# Patient Record
Sex: FEMALE | Race: WHITE | NOT HISPANIC OR LATINO | Employment: OTHER | URBAN - METROPOLITAN AREA
[De-identification: names, ages, dates, MRNs, and addresses within clinical notes are randomized per-mention and may not be internally consistent; named-entity substitution may affect disease eponyms.]

---

## 2023-03-23 ENCOUNTER — OFFICE VISIT (OUTPATIENT)
Dept: GASTROENTEROLOGY | Facility: CLINIC | Age: 83
End: 2023-03-23

## 2023-03-23 ENCOUNTER — TELEPHONE (OUTPATIENT)
Dept: GASTROENTEROLOGY | Facility: CLINIC | Age: 83
End: 2023-03-23

## 2023-03-23 VITALS
SYSTOLIC BLOOD PRESSURE: 110 MMHG | WEIGHT: 133 LBS | BODY MASS INDEX: 24.48 KG/M2 | HEIGHT: 62 IN | DIASTOLIC BLOOD PRESSURE: 60 MMHG

## 2023-03-23 DIAGNOSIS — Z79.02 LONG TERM CURRENT USE OF ANTITHROMBOTICS/ANTIPLATELETS: ICD-10-CM

## 2023-03-23 DIAGNOSIS — R14.2 BELCHING: ICD-10-CM

## 2023-03-23 DIAGNOSIS — Z86.010 HISTORY OF COLON POLYPS: ICD-10-CM

## 2023-03-23 DIAGNOSIS — K58.2 IRRITABLE BOWEL SYNDROME WITH BOTH CONSTIPATION AND DIARRHEA: ICD-10-CM

## 2023-03-23 DIAGNOSIS — G45.9 TIA (TRANSIENT ISCHEMIC ATTACK): ICD-10-CM

## 2023-03-23 DIAGNOSIS — R13.19 ESOPHAGEAL DYSPHAGIA: Primary | ICD-10-CM

## 2023-03-23 PROBLEM — Z86.0100 HISTORY OF COLON POLYPS: Status: ACTIVE | Noted: 2023-03-23

## 2023-03-23 RX ORDER — NIACIN 500 MG
500 TABLET ORAL
COMMUNITY

## 2023-03-23 RX ORDER — LORAZEPAM 0.5 MG/1
0.5 TABLET ORAL AS NEEDED
COMMUNITY

## 2023-03-23 RX ORDER — VITAMIN B COMPLEX
TABLET ORAL DAILY
COMMUNITY

## 2023-03-23 RX ORDER — ACETAMINOPHEN 500 MG
500 TABLET ORAL 3 TIMES DAILY PRN
COMMUNITY

## 2023-03-23 RX ORDER — EZETIMIBE 10 MG/1
10 TABLET ORAL DAILY
COMMUNITY

## 2023-03-23 RX ORDER — CLOPIDOGREL BISULFATE 75 MG/1
75 TABLET ORAL DAILY
COMMUNITY

## 2023-03-23 RX ORDER — DIPHENOXYLATE HYDROCHLORIDE AND ATROPINE SULFATE 2.5; .025 MG/1; MG/1
1 TABLET ORAL 4 TIMES DAILY PRN
COMMUNITY

## 2023-03-23 RX ORDER — SIMVASTATIN 40 MG
40 TABLET ORAL
COMMUNITY

## 2023-03-23 RX ORDER — LOSARTAN POTASSIUM AND HYDROCHLOROTHIAZIDE 12.5; 5 MG/1; MG/1
1 TABLET ORAL DAILY
COMMUNITY

## 2023-03-23 RX ORDER — IBANDRONATE SODIUM 150 MG/1
150 TABLET, FILM COATED ORAL
COMMUNITY

## 2023-03-23 RX ORDER — MULTIVIT-MIN/IRON/FOLIC ACID/K 18-600-40
CAPSULE ORAL DAILY
COMMUNITY

## 2023-03-23 RX ORDER — ALBUTEROL SULFATE 90 UG/1
2 POWDER, METERED RESPIRATORY (INHALATION) EVERY 4 HOURS PRN
COMMUNITY

## 2023-03-23 NOTE — TELEPHONE ENCOUNTER
Scheduled date of EGD(as of today):4/20/23  Physician performing EGD:NBA  Location of EGD:[facility]  Instructions reviewed with patient by: LOI  Clearances:  Wilfrid Mcconnell

## 2023-03-23 NOTE — PROGRESS NOTES
[address] [facility] - Outpatient Consultation  Bret Chaudhry [de-identified] y o  female MRN: [id number]  Encounter: [id number]    ASSESSMENT AND PLAN:      1  Esophageal dysphagia  82F here today as a new pt at the request of Dr Vanda Primrose, MD for dysphagia and belching/regurgitation  Ddx: gerd w stricture vs presbyesophagus/esoph spasms? - Schedule EGD @ [address] [facility] for EOE and dilate  - Pending EGD, consider PPI therapy    2  Belching/Regurgitation  Sounds like reflux - pt w hx of similar symptoms needing PPI in past     - GERD lifestyle modifications  - Stay upright for 2-3 hours post eating    3  Irritable bowel syndrome with both constipation and diarrhea  Well controlled as long as she takes citracel  Neg colonoscopy in the past     4  History of colon polyps  Will obtain reports from [facility]    5  TIA (transient ischemic attack)  Pt states doctors are split about her 20 min hand numbness spells  Some think its a TIA and started her on Plavix  Others feel it's more neurologic radiculopathy/numbness  6  Long term current use of antithrombotics/antiplatelets  Regardless on Plavix  Will need OK from cardiology to hold  Patient on chronic Plavix  Risks and benefits of holding the Plavix prior to the procedure discussed with the patient  Patient understands the risks of embolic events while holding the Plavix  Patient understands the risks of bleeding if continuation of Plavix is chosen  Followup Appointment: 3 months  ______________________________________________________________________    Chief Complaint   Patient presents with   • Dysphagia   • belching       HPI:   Bret Chaudhry is a [de-identified]y o  year old female who presents today at the request of her PCP for dysphagia  Patient states that she had similar symptoms long time ago  Had an endoscopy with dilation in 2016 at [address] [facility] [address] for GI health    During that time she did take omeprazole for a few months  Generally does well and has not had any issues until the past 3 months when she has had progressive dysphagia for solids and liquids  Does not smoke or drink  She feels the food or liquid bolus gets hung up in the middle of her esophagus  Some discomfort with this  Very slow moving  No coughing  A lot of regurgitation and belching  Sometimes she can taste the meal in her mouth  Denies heartburn  No abdominal pains or nausea  Has baseline irritable bowel with diarrhea alternating with constipation  Well-controlled as long as she takes her Citrucel and drinks her water      Historical Information   Past Medical History:   Diagnosis Date   • Colon polyp    • Hyperlipidemia    • Irritable bowel syndrome    • S/P dilatation of esophageal stricture    • TIA (transient ischemic attack)      Past Surgical History:   Procedure Laterality Date   • ANKLE FRACTURE SURGERY     • APPENDECTOMY     • CHOLECYSTECTOMY     • COLONOSCOPY     • ELBOW FRACTURE SURGERY     • TONSILLECTOMY     • UPPER GASTROINTESTINAL ENDOSCOPY     • WRIST FRACTURE SURGERY       Social History     Substance and Sexual Activity   Alcohol Use Not Currently     Social History     Substance and Sexual Activity   Drug Use Not on file     Social History     Tobacco Use   Smoking Status Never   Smokeless Tobacco Never     Family History   Adopted: Yes   Problem Relation Age of Onset   • Colon cancer Neg Hx    • Colon polyps Neg Hx        Meds/Allergies     Current Outpatient Medications:   •  acetaminophen (TYLENOL) 500 mg tablet  •  Albuterol Sulfate (ProAir RespiClick) 242 (90 Base) MCG/ACT AEPB  •  Cholecalciferol (Vitamin D) 50 MCG (2000 UT) CAPS  •  clopidogrel (PLAVIX) 75 mg tablet  •  Coenzyme Q10 (CoQ10) 100 MG CAPS  •  conjugated estrogens (PREMARIN) 0 625 mg tablet  •  diphenoxylate-atropine (LOMOTIL) 2 5-0 025 mg per tablet  •  ezetimibe (ZETIA) 10 mg tablet  •  ibandronate (BONIVA) 150 MG tablet  •  LORazepam (ATIVAN) 0 5 mg tablet  •  losartan-hydrochlorothiazide (HYZAAR) 50-12 5 mg per tablet  •  niacin 500 mg tablet  •  sertraline (ZOLOFT) 50 mg tablet  •  simvastatin (ZOCOR) 40 mg tablet    Allergies   Allergen Reactions   • Aspirin Other (See Comments)     Doesn't set well in stomach   • Ibuprofen Swelling     Hands and feet       PHYSICAL EXAM:    Blood pressure 110/60, height 5' 2" (1 575 m), weight 60 3 kg (133 lb)  Body mass index is 24 33 kg/m²  General Appearance: NAD, cooperative, alert  Eyes: Anicteric, PERRLA, EOMI  ENT:  Normocephalic, atraumatic, normal mucosa  Neck:  Supple, symmetrical, trachea midline,   Resp:  Clear to auscultation bilaterally; no rales, rhonchi or wheezing; respirations unlabored   CV:  S1 S2, Regular rate and rhythm; no murmur, rub, or gallop  GI:  Soft, non-tender, non-distended; normal bowel sounds; no masses, no organomegaly   Rectal: Deferred  Musculoskeletal: No cyanosis, clubbing or edema  Normal ROM  Skin:  No jaundice, rashes, or lesions   Heme/Lymph: No palpable cervical lymphadenopathy  Psych: Normal affect, good eye contact  Neuro: No gross deficits, AAOx3    Lab Results:   No results found for: WBC, HGB, HCT, MCV, PLT  No results found for: NA, K, CL, CO2, ANIONGAP, BUN, CREATININE, GLUCOSE, GLUF, CALCIUM, CORRECTEDCA, AST, ALT, ALKPHOS, PROT, BILITOT, EGFR  No results found for: IRON, TIBC, FERRITIN  No results found for: LIPASE    Radiology Results:   No results found  REVIEW OF SYSTEMS:    CONSTITUTIONAL: Denies any fever, chills, rigors, and weight loss  HEENT: No earache or tinnitus  Denies hearing loss or visual disturbances  CARDIOVASCULAR: No chest pain or palpitations  RESPIRATORY: Denies any cough, hemoptysis, shortness of breath or dyspnea on exertion  GASTROINTESTINAL: As noted in the History of Present Illness  GENITOURINARY: No problems with urination  Denies any hematuria or dysuria  NEUROLOGIC: No dizziness or vertigo, denies headaches  MUSCULOSKELETAL: Denies any muscle or joint pain  SKIN: Denies skin rashes or itching  ENDOCRINE: Denies excessive thirst  Denies intolerance to heat or cold  PSYCHOSOCIAL: Denies depression or anxiety  Denies any recent memory loss

## 2023-03-23 NOTE — LETTER
March 23, 2023     [address]  [facility] [address] [address]    Patient: Cassie Ross   YOB: 1940   Date of Visit: 3/23/2023       Dear Dr Tonio Henry: Thank you for referring Cassie Ross to me for evaluation  Below are my notes for this consultation  If you have questions, please do not hesitate to call me  I look forward to following your patient along with you  Sincerely,        Flori Mark MD        CC: Leander Sierra MD  3/23/2023  5:13 PM  Sign when Signing Visit    [facility] - Outpatient Consultation  Cassie Ross [de-identified] y o  female MRN: [id number]  Encounter: [id number]    ASSESSMENT AND PLAN:      1  Esophageal dysphagia  82F here today as a new pt at the request of Dr Margy Barrett MD for dysphagia and belching/regurgitation  Ddx: gerd w stricture vs presbyesophagus/esoph spasms? - Schedule EGD @ [address] [place] [address] for EOE and dilate  - Pending EGD, consider PPI therapy    2  Belching/Regurgitation  Sounds like reflux - pt w hx of similar symptoms needing PPI in past     - GERD lifestyle modifications  - Stay upright for 2-3 hours post eating    3  Irritable bowel syndrome with both constipation and diarrhea  Well controlled as long as she takes citracel  Neg colonoscopy in the past     4  History of colon polyps  Will obtain reports from [facility]    5  TIA (transient ischemic attack)  Pt states doctors are split about her 20 min hand numbness spells  Some think its a TIA and started her on Plavix  Others feel it's more neurologic radiculopathy/numbness  6  Long term current use of antithrombotics/antiplatelets  Regardless on Plavix  Will need OK from cardiology to hold  Patient on chronic Plavix  Risks and benefits of holding the Plavix prior to the procedure discussed with the patient  Patient understands the risks of embolic events while holding the Plavix  Patient understands the risks of bleeding if continuation of Plavix is chosen  Followup Appointment: 3 months  ______________________________________________________________________    Chief Complaint   Patient presents with   • Dysphagia   • belching       HPI:   Jaswant Canales is a [de-identified]y o  year old female who presents today at the request of her PCP for dysphagia  Patient states that she had similar symptoms long time ago  Had an endoscopy with dilation in 2016 at [address] [facility], [address] for GI health  During that time she did take omeprazole for a few months  Generally does well and has not had any issues until the past 3 months when she has had progressive dysphagia for solids and liquids  Does not smoke or drink  She feels the food or liquid bolus gets hung up in the middle of her esophagus  Some discomfort with this  Very slow moving  No coughing  A lot of regurgitation and belching  Sometimes she can taste the meal in her mouth  Denies heartburn  No abdominal pains or nausea  Has baseline irritable bowel with diarrhea alternating with constipation  Well-controlled as long as she takes her Citrucel and drinks her water      Historical Information   Past Medical History:   Diagnosis Date   • Colon polyp    • Hyperlipidemia    • Irritable bowel syndrome    • S/P dilatation of esophageal stricture    • TIA (transient ischemic attack)      Past Surgical History:   Procedure Laterality Date   • ANKLE FRACTURE SURGERY     • APPENDECTOMY     • CHOLECYSTECTOMY     • COLONOSCOPY     • ELBOW FRACTURE SURGERY     • TONSILLECTOMY     • UPPER GASTROINTESTINAL ENDOSCOPY     • WRIST FRACTURE SURGERY       Social History     Substance and Sexual Activity   Alcohol Use Not Currently     Social History     Substance and Sexual Activity   Drug Use Not on file     Social History     Tobacco Use   Smoking Status Never   Smokeless Tobacco Never     Family History   Adopted: Yes   Problem Relation Age of Onset   • Colon cancer Neg Hx    • Colon polyps Neg Hx        Meds/Allergies      Current Outpatient Medications:   •  acetaminophen (TYLENOL) 500 mg tablet  •  Albuterol Sulfate (ProAir RespiClick) 378 (90 Base) MCG/ACT AEPB  •  Cholecalciferol (Vitamin D) 50 MCG (2000 UT) CAPS  •  clopidogrel (PLAVIX) 75 mg tablet  •  Coenzyme Q10 (CoQ10) 100 MG CAPS  •  conjugated estrogens (PREMARIN) 0 625 mg tablet  •  diphenoxylate-atropine (LOMOTIL) 2 5-0 025 mg per tablet  •  ezetimibe (ZETIA) 10 mg tablet  •  ibandronate (BONIVA) 150 MG tablet  •  LORazepam (ATIVAN) 0 5 mg tablet  •  losartan-hydrochlorothiazide (HYZAAR) 50-12 5 mg per tablet  •  niacin 500 mg tablet  •  sertraline (ZOLOFT) 50 mg tablet  •  simvastatin (ZOCOR) 40 mg tablet    Allergies   Allergen Reactions   • Aspirin Other (See Comments)     Doesn't set well in stomach   • Ibuprofen Swelling     Hands and feet       PHYSICAL EXAM:    Blood pressure 110/60, height 5' 2" (1 575 m), weight 60 3 kg (133 lb)  Body mass index is 24 33 kg/m²  General Appearance: NAD, cooperative, alert  Eyes: Anicteric, PERRLA, EOMI  ENT:  Normocephalic, atraumatic, normal mucosa  Neck:  Supple, symmetrical, trachea midline,   Resp:  Clear to auscultation bilaterally; no rales, rhonchi or wheezing; respirations unlabored   CV:  S1 S2, Regular rate and rhythm; no murmur, rub, or gallop  GI:  Soft, non-tender, non-distended; normal bowel sounds; no masses, no organomegaly   Rectal: Deferred  Musculoskeletal: No cyanosis, clubbing or edema  Normal ROM    Skin:  No jaundice, rashes, or lesions   Heme/Lymph: No palpable cervical lymphadenopathy  Psych: Normal affect, good eye contact  Neuro: No gross deficits, AAOx3    Lab Results:   No results found for: WBC, HGB, HCT, MCV, PLT  No results found for: NA, K, CL, CO2, ANIONGAP, BUN, CREATININE, GLUCOSE, GLUF, CALCIUM, CORRECTEDCA, AST, ALT, ALKPHOS, PROT, BILITOT, EGFR  No results found for: IRON, TIBC, FERRITIN  No results found for: LIPASE    Radiology Results:   No results found  REVIEW OF SYSTEMS:    CONSTITUTIONAL: Denies any fever, chills, rigors, and weight loss  HEENT: No earache or tinnitus  Denies hearing loss or visual disturbances  CARDIOVASCULAR: No chest pain or palpitations  RESPIRATORY: Denies any cough, hemoptysis, shortness of breath or dyspnea on exertion  GASTROINTESTINAL: As noted in the History of Present Illness  GENITOURINARY: No problems with urination  Denies any hematuria or dysuria  NEUROLOGIC: No dizziness or vertigo, denies headaches  MUSCULOSKELETAL: Denies any muscle or joint pain  SKIN: Denies skin rashes or itching  ENDOCRINE: Denies excessive thirst  Denies intolerance to heat or cold  PSYCHOSOCIAL: Denies depression or anxiety  Denies any recent memory loss

## 2023-03-29 ENCOUNTER — TELEPHONE (OUTPATIENT)
Dept: GASTROENTEROLOGY | Facility: CLINIC | Age: 83
End: 2023-03-29

## 2023-03-29 NOTE — TELEPHONE ENCOUNTER
Spoke to patient and confirmed with her that her last dose of plavix would be 4/14/2023 prior to EGD

## 2023-04-06 ENCOUNTER — TELEPHONE (OUTPATIENT)
Dept: GASTROENTEROLOGY | Facility: CLINIC | Age: 83
End: 2023-04-06

## 2023-04-06 NOTE — TELEPHONE ENCOUNTER
Procedure confirmed  Endoscopy     Via: Voice mail    Instructions given: Given to Patient at Visit     Prep Given: N/A    Call the office if there are any questions  Voicemail included reminder last dose Plavix is 4/14/2023

## 2023-04-20 ENCOUNTER — ANESTHESIA (OUTPATIENT)
Dept: ANESTHESIOLOGY | Facility: AMBULATORY SURGERY CENTER | Age: 83
End: 2023-04-20

## 2023-04-20 ENCOUNTER — HOSPITAL ENCOUNTER (OUTPATIENT)
Dept: GASTROENTEROLOGY | Facility: AMBULATORY SURGERY CENTER | Age: 83
Discharge: HOME | End: 2023-04-20
Attending: INTERNAL MEDICINE

## 2023-04-20 ENCOUNTER — ANESTHESIA EVENT (OUTPATIENT)
Dept: ANESTHESIOLOGY | Facility: AMBULATORY SURGERY CENTER | Age: 83
End: 2023-04-20

## 2023-04-20 VITALS
WEIGHT: 126 LBS | TEMPERATURE: 97.9 F | DIASTOLIC BLOOD PRESSURE: 64 MMHG | SYSTOLIC BLOOD PRESSURE: 134 MMHG | HEART RATE: 67 BPM | RESPIRATION RATE: 13 BRPM | HEIGHT: 62 IN | BODY MASS INDEX: 23.19 KG/M2 | OXYGEN SATURATION: 98 %

## 2023-04-20 DIAGNOSIS — R13.19 ESOPHAGEAL DYSPHAGIA: ICD-10-CM

## 2023-04-20 RX ORDER — SODIUM CHLORIDE, SODIUM LACTATE, POTASSIUM CHLORIDE, CALCIUM CHLORIDE 600; 310; 30; 20 MG/100ML; MG/100ML; MG/100ML; MG/100ML
50 INJECTION, SOLUTION INTRAVENOUS CONTINUOUS
Status: DISCONTINUED | OUTPATIENT
Start: 2023-04-20 | End: 2023-04-24 | Stop reason: HOSPADM

## 2023-04-20 RX ADMIN — SODIUM CHLORIDE, SODIUM LACTATE, POTASSIUM CHLORIDE, CALCIUM CHLORIDE 50 ML/HR: 600; 310; 30; 20 INJECTION, SOLUTION INTRAVENOUS at 13:35

## 2023-04-20 NOTE — H&P
"History and Physical - [facility]    Pratik President [de-identified] y o  female MRN: [id number]      HPI: Pratik President is a [de-identified]y o  year old female who presents for dysphagia  Plavix held  Allergies   Allergen Reactions   • Amoxicillin-Pot Clavulanate GI Intolerance   • Aspirin Other (See Comments)     Doesn't set well in stomach   • Ibuprofen Swelling     Hands and feet   • Nexium [Esomeprazole] Myalgia   • Nortriptyline GI Intolerance     \"heartburn\"   • Prednisone Other (See Comments)     Hyper/insomnia/flushed         REVIEW OF SYSTEMS: Per the HPI, and otherwise unremarkable      Historical Information     Past Medical History:   Diagnosis Date   • Colon polyp    • Glaucoma    • Hyperlipidemia    • Irritable bowel syndrome    • S/P dilatation of esophageal stricture    • TIA (transient ischemic attack)      Past Surgical History:   Procedure Laterality Date   • ANKLE FRACTURE SURGERY Right    • APPENDECTOMY     • CATARACT EXTRACTION Left    • CHOLECYSTECTOMY     • COLONOSCOPY     • ELBOW FRACTURE SURGERY Left    • TONSILLECTOMY     • UPPER GASTROINTESTINAL ENDOSCOPY     • WRIST FRACTURE SURGERY Left      Social History   Social History     Substance and Sexual Activity   Alcohol Use Not Currently     Social History     Substance and Sexual Activity   Drug Use Never     Social History     Tobacco Use   Smoking Status Never   Smokeless Tobacco Never     Family History   Adopted: Yes   Problem Relation Age of Onset   • Colon cancer Neg Hx    • Colon polyps Neg Hx        Meds/Allergies       Current Outpatient Medications:   •  acetaminophen (TYLENOL) 500 mg tablet  •  Cholecalciferol (Vitamin D) 50 MCG (2000 UT) CAPS  •  Coenzyme Q10 (CoQ10) 100 MG CAPS  •  diphenoxylate-atropine (LOMOTIL) 2 5-0 025 mg per tablet  •  ezetimibe (ZETIA) 10 mg tablet  •  ibandronate (BONIVA) 150 MG tablet  •  losartan-hydrochlorothiazide (HYZAAR) 50-12 5 mg per tablet  •  niacin 500 mg tablet  •  sertraline " "(ZOLOFT) 50 mg tablet  •  simvastatin (ZOCOR) 40 mg tablet  •  Albuterol Sulfate (ProAir RespiClick) 672 (90 Base) MCG/ACT AEPB  •  clopidogrel (PLAVIX) 75 mg tablet  •  conjugated estrogens (PREMARIN) 0 625 mg tablet  •  LORazepam (ATIVAN) 0 5 mg tablet    Current Facility-Administered Medications:   •  lactated ringers infusion, 50 mL/hr, Intravenous, Continuous, Continue from Pre-op at 04/20/23 1344        Objective     /62   Pulse 68   Temp 97 9 °F (36 6 °C) (Temporal)   Resp 15   Ht 5' 2\" (1 575 m)   Wt 57 2 kg (126 lb)   SpO2 93%   BMI 23 05 kg/m²       PHYSICAL EXAM    Gen: NAD AAOx3  Head: Normocephalic, Atraumatic  CV: S1S2 RRR no m/r/g  CHEST: Clear b/l no c/r/w  ABD: soft, +BS NT/ND no masses  EXT: no edema      ASSESSMENT/PLAN:  This is a [de-identified]y o  year old female here for EGD/DILATION, and she is stable and optimized for her procedure        "

## 2023-04-20 NOTE — ANESTHESIA PREPROCEDURE EVALUATION
Procedure:  PRE-OP ONLY    Relevant Problems   GI/HEPATIC   (+) Esophageal dysphagia      NEURO/PSYCH   (+) History of colon polyps   (+) TIA (transient ischemic attack)             Anesthesia Plan  ASA Score- 3     Anesthesia Type- IV sedation with anesthesia with ASA Monitors  Additional Monitors:   Airway Plan:           Plan Factors-    Chart reviewed  Patient is not a current smoker  Induction- intravenous  Postoperative Plan-     Informed Consent- Anesthetic plan and risks discussed with patient  I personally reviewed this patient with the CRNA  Discussed and agreed on the Anesthesia Plan with the CRNA  Padilla Boyer

## 2023-06-05 ENCOUNTER — TELEPHONE (OUTPATIENT)
Dept: GASTROENTEROLOGY | Facility: CLINIC | Age: 83
End: 2023-06-05

## 2023-06-06 ENCOUNTER — OFFICE VISIT (OUTPATIENT)
Dept: GASTROENTEROLOGY | Facility: CLINIC | Age: 83
End: 2023-06-06
Payer: COMMERCIAL

## 2023-06-06 VITALS
HEIGHT: 62 IN | SYSTOLIC BLOOD PRESSURE: 90 MMHG | WEIGHT: 132 LBS | DIASTOLIC BLOOD PRESSURE: 50 MMHG | BODY MASS INDEX: 24.29 KG/M2

## 2023-06-06 DIAGNOSIS — R13.19 ESOPHAGEAL DYSPHAGIA: ICD-10-CM

## 2023-06-06 DIAGNOSIS — R15.9 FULL INCONTINENCE OF FECES: ICD-10-CM

## 2023-06-06 DIAGNOSIS — Z86.010 HISTORY OF COLON POLYPS: ICD-10-CM

## 2023-06-06 DIAGNOSIS — K58.2 IRRITABLE BOWEL SYNDROME WITH BOTH CONSTIPATION AND DIARRHEA: Primary | ICD-10-CM

## 2023-06-06 DIAGNOSIS — G45.9 TIA (TRANSIENT ISCHEMIC ATTACK): ICD-10-CM

## 2023-06-06 DIAGNOSIS — Z79.02 LONG TERM CURRENT USE OF ANTITHROMBOTICS/ANTIPLATELETS: ICD-10-CM

## 2023-06-06 PROCEDURE — 99214 OFFICE O/P EST MOD 30 MIN: CPT | Performed by: INTERNAL MEDICINE

## 2023-06-06 RX ORDER — LOPERAMIDE HYDROCHLORIDE 2 MG/1
2 CAPSULE ORAL EVERY MORNING
Qty: 30 CAPSULE | Refills: 1 | Status: SHIPPED | OUTPATIENT
Start: 2023-06-06

## 2023-06-06 RX ORDER — CLOPIDOGREL BISULFATE 75 MG/1
TABLET ORAL
COMMUNITY

## 2023-06-06 RX ORDER — METHYLCELLULOSE 2 G/19G
POWDER, FOR SOLUTION ORAL
COMMUNITY
End: 2023-06-06

## 2023-06-06 NOTE — PROGRESS NOTES
[facility] - Outpatient Follow-up Note  Kallie Cui [de-identified] y o  female MRN: [id number]  Encounter: [id number]    ASSESSMENT AND PLAN:      1  Irritable bowel syndrome with both constipation and diarrhea  82F w long hx of IBS, aggravated by stress and eating  Sometimes constipated but main issue now seems more of incontinence w loose stools  - Will stop Citracel for now  - Increase water intake  - Start imodium 1 tab qam and will titrate to comfort so she can go out without accidents  - pelvic floor PT  - dairy free diet trial    - loperamide (IMODIUM) 2 mg capsule; Take 1 capsule (2 mg total) by mouth every morning  Dispense: 30 capsule; Refill: 1    2  Full incontinence of feces    - Ambulatory Referral to Physical Therapy; Future    3  Esophageal dysphagia  Improved after EGD/dilation in 4/2023    4  History of colon polyps  No further recall due to age  5  TIA (transient ischemic attack)  On Plavix    6  Long term current use of antithrombotics/antiplatelets        Followup Appointment: 3 months  ______________________________________________________________________    Chief Complaint   Patient presents with   • Diarrhea   • Inflammatory Bowel Disease     worsening     HPI: 82-year-old female with long history of IBS here today for for follow-up  Did have her EGD with dilation for dysphagia 2 months ago with resolution of her symptoms  Otherwise doing well from an upper GI perspective  Today, she wants to discuss her IBS  In the past, she used to be constipated but she says with age, she has grown more towards the diarrhea  She normally has 1 soft formed bowel movement in the morning followed by couple episodes of explosive loose bowels  Sometimes she has little stool balls which she attributes to her diverticular pockets  She has been on Citrucel  She is scared to eat when she is out    She has had episodes of full fecal incontinence where she found stool smeared in her underpants  No bleeding  No weight loss  She takes Lomotil as needed about couple times a week but nothing on a regular basis      Historical Information   Past Medical History:   Diagnosis Date   • Colon polyp    • Glaucoma    • Hyperlipidemia    • Irritable bowel syndrome    • S/P dilatation of esophageal stricture    • TIA (transient ischemic attack)      Past Surgical History:   Procedure Laterality Date   • ANKLE FRACTURE SURGERY Right    • APPENDECTOMY     • CATARACT EXTRACTION Left    • CHOLECYSTECTOMY     • COLONOSCOPY     • ELBOW FRACTURE SURGERY Left    • TONSILLECTOMY     • UPPER GASTROINTESTINAL ENDOSCOPY     • WRIST FRACTURE SURGERY Left      Social History     Substance and Sexual Activity   Alcohol Use Not Currently     Social History     Substance and Sexual Activity   Drug Use Never     Social History     Tobacco Use   Smoking Status Never   Smokeless Tobacco Never     Family History   Adopted: Yes   Problem Relation Age of Onset   • Colon cancer Neg Hx    • Colon polyps Neg Hx          Current Outpatient Medications:   •  acetaminophen (TYLENOL) 500 mg tablet  •  Albuterol Sulfate (ProAir RespiClick) 338 (90 Base) MCG/ACT AEPB  •  Cholecalciferol (Vitamin D) 50 MCG (2000 UT) CAPS  •  clopidogrel (PLAVIX) 75 mg tablet  •  Coenzyme Q10 (CoQ10) 100 MG CAPS  •  conjugated estrogens (PREMARIN) 0 625 mg tablet  •  diphenoxylate-atropine (LOMOTIL) 2 5-0 025 mg per tablet  •  ezetimibe (ZETIA) 10 mg tablet  •  ibandronate (BONIVA) 150 MG tablet  •  loperamide (IMODIUM) 2 mg capsule  •  LORazepam (ATIVAN) 0 5 mg tablet  •  losartan-hydrochlorothiazide (HYZAAR) 50-12 5 mg per tablet  •  niacin 500 mg tablet  •  sertraline (ZOLOFT) 50 mg tablet  •  simvastatin (ZOCOR) 40 mg tablet  Allergies   Allergen Reactions   • Amoxicillin-Pot Clavulanate GI Intolerance   • Aspirin Other (See Comments)     Doesn't set well in stomach   • Ibuprofen Swelling     Hands and feet   • Nexium [Esomeprazole] "Myalgia   • Nortriptyline GI Intolerance     \"heartburn\"   • Prednisone Other (See Comments)     Hyper/insomnia/flushed     Reviewed medications and allergies and updated as indicated    PHYSICAL EXAM:    Blood pressure 90/50, height 5' 2\" (1 575 m), weight 59 9 kg (132 lb)  Body mass index is 24 14 kg/m²  General Appearance: NAD, cooperative, alert  Eyes: Anicteric, PERRLA, EOMI  ENT:  Normocephalic, atraumatic, normal mucosa  Neck:  Supple, symmetrical, trachea midline  Resp:  Clear to auscultation bilaterally; no rales, rhonchi or wheezing; respirations unlabored   CV:  S1 S2, Regular rate and rhythm; no murmur, rub, or gallop  GI:  Soft, non-tender, non-distended; normal bowel sounds; no masses, no organomegaly   Rectal: Deferred  Musculoskeletal: No cyanosis, clubbing or edema  Normal ROM  Skin:  No jaundice, rashes, or lesions   Heme/Lymph: No palpable cervical lymphadenopathy  Psych: Normal affect, good eye contact  Neuro: No gross deficits, AAOx3    Lab Results:   No results found for: \"HCT\", \"HGB\", \"MCV\", \"PLT\", \"WBC\"  No results found for: \"ALKPHOS\", \"ALT\", \"ANIONGAP\", \"AST\", \"BILITOT\", \"BUN\", \"CALCIUM\", \"CL\", \"CO2\", \"CORRECTEDCA\", \"CREATININE\", \"EGFR\", \"GLUCOSE\", \"GLUF\", \"K\", \"NA\", \"PROT\"  No results found for: \"FERRITIN\", \"IRON\", \"TIBC\"  No results found for: \"LIPASE\"    Radiology Results:   No results found    "

## 2023-06-06 NOTE — TELEPHONE ENCOUNTER
Called pt back, she notes has had IBS diarrhea for many years but her symptoms have recently worsened  OV scheduled today

## 2023-06-06 NOTE — PATIENT INSTRUCTIONS
Pelvic Floor Physical Therapy Locations      Community / Private Physical Therapists    This link allows patients to look up regional referral centers in their local area that may be able to perform pelvic floor physical therapy and biofeedback  There may be additional resources in your local area, but this is one reliable source: [place] it    [address]  [address] [facility]  [address]  [address] ([place]) [address]  [address], [address] [place]   Phone: [phone]   Fax: [phone]  [employer] is    [address]  [address]  [facility]  Phone: [phone]  Fax: [phone] [address]  [address] ,  [facility]  Phone [phone] Fax [phone]      DAIRY-FREE DIET    Lactose is the sugar that is found naturally in milk and milk products, as well as foods with ingredients such as milk or whey  In order for the body to use lactose, it has to break it down using an enzyme called lactase  Lactose intolerance is a condition that occurs when a person does not produce enough lactase to break down the lactose in food  Symptoms of lactose intolerance include:    Abdominal cramping  Bloating  Gas  Diarrhea    These symptoms may occur shortly after eating foods that contain lactose, but sometimes will not occur until hours later  This is because people vary in the amount of lactose they can tolerate      Avoiding Lactose    There are many different words that are used to describe the different forms that lactose may come in  Read food labels, and stay away from foods with any of the following ingredients:    Milk  Skim milk powder  Skim milk solids  Lactose  Whey  Caseinate  Curd    Reading food/nutrition labels is a very important habit to "get into when looking for foods that do not contain lactose  Lactose can come in many \"hidden\" forms, and even products that do not appear to have milk included, may in fact have a milk product as an ingredient      Lactose-Free Foods    Following are lists of lactose-free foods, and foods to avoid:    Food Group     Beverages  ENJOY: Teas, regular iced tea, regular carbonated beverages, soy milk, cocoa powder, Nestle's Quik   AVOID: Milk (all types), powdered milk, sweetened/condensed milk, instant hot cocoa, instant iced tea, Ovaltine, chocolate drink mixes, cream, half-n-half and diet soda    Breads/Cereals/Crackers   ENJOY: [place] bread, [place] bread, Adventist rye bread, [place] bread, joaquin crackers, soda crackers, Ritz crackers, hot or cold cereals without added milk solids (read the label)   AVOID: Breads/rolls containing milk, prepared baking mixes (muffins, biscuits, pancakes, etc ), Zwieback, corn nuts, instant cereal with added milk solids    Potatoes and Starches   ENJOY: Items prepared without milk or milk products: macaroni, noodles, rice, spaghetti, white/sweet potatoes   AVOID: Products with milk added, such as instant potatoes, frozen [place] fries, Scalloped/au gratin potatoes, macaroni and cheese mixes    Vegetables   ENJOY: Fresh, frozen, and canned vegetables without added milk products   AVOID: Creamed or breaded vegetables or vegetables with margarine added    Fruit   ENJOY: Fresh, canned or frozen fruit not processed with milk/milk products   AVOID: Any canned or frozen fruit processed with milk or milk products    Meat and Meat Substitutes   ENJOY: Plain meat, fish, poultry, eggs, Kosher prepared meat products, soybean meat substitutes, dried peas, beans, lentils, and nuts   AVOID: Breaded or creamed eggs, fish, meat or poultry, luncheon meats, sausage, hot dogs containing cheese or cheese products    Fats and Oils   ENJOY: Wang, shortening, Miracle Whip, milk-free margarine, diet imitation " margarine, salad dressings without milk products, vegetable oils, olives, mayonnaise, Coffee Rich and Rich's Whipped Topping   AVOID: Sour cream, cream cheese, chip dips, sauces and salad dressings made with milk products and peanut butter with added milk solids    Soups/Combination Foods   ENJOY: Bouillon, broth, vegetable soups, clear, soups, consommes, homemade soups made with allowed ingredients   AVOID: Chowders, ream soups, canned and dehydrated soups containing milk products    Seasonings   ENJOY: Pure monosodium glutamate (msg), soy sauce, carob powder, olives, gravy made with water, Baker's cocoa, pure seasonings and spices, sugar, honey, corn syrup, jam, jelly, marmalade, and molasses   AVOID: Condiments with milk solids or lactose added    Desserts   ENJOY: [place]'s, homemade cookies, cakes or pies made from allowed ingredients, tofu desserts, pure-sugar candies, marshmallows, and gelatin   AVOID: Desserts prepared with milk/milk products, pudding, sherbet, ice cream, custard, frozen yogurt, toffee, peppermint, butterscotch, chocolate, caramels, reduced-calorie desserts made with sugar substitute, or chewing gum made with lactose

## 2023-06-16 ENCOUNTER — TELEPHONE (OUTPATIENT)
Age: 83
End: 2023-06-16

## 2023-06-16 NOTE — TELEPHONE ENCOUNTER
Patients GI provider:  Dr Sherin Kohler MD    Number to return call: ( [phone])     Reason for call: Pt calling to update   On how she is feeling with her IBS since starting the    Loperamide HCl 2 mg       Scheduled procedure/appointment date if applicable: Apt/procedure

## 2023-06-19 NOTE — TELEPHONE ENCOUNTER
I spoke with patient  She is doing much better, she eliminated dairy (using almond milk)  and chocolate and past two days have had normal bowel movements  She is not taking the Imodium daily but does have it on hand for prn use

## 2023-07-20 ENCOUNTER — OFFICE VISIT (OUTPATIENT)
Dept: GASTROENTEROLOGY | Facility: CLINIC | Age: 83
End: 2023-07-20
Payer: COMMERCIAL

## 2023-07-20 VITALS
HEIGHT: 62 IN | WEIGHT: 129 LBS | BODY MASS INDEX: 23.74 KG/M2 | DIASTOLIC BLOOD PRESSURE: 52 MMHG | SYSTOLIC BLOOD PRESSURE: 105 MMHG

## 2023-07-20 DIAGNOSIS — R13.19 ESOPHAGEAL DYSPHAGIA: Primary | ICD-10-CM

## 2023-07-20 DIAGNOSIS — Z86.010 HISTORY OF COLON POLYPS: ICD-10-CM

## 2023-07-20 DIAGNOSIS — K58.2 IRRITABLE BOWEL SYNDROME WITH BOTH CONSTIPATION AND DIARRHEA: ICD-10-CM

## 2023-07-20 DIAGNOSIS — Z79.02 LONG TERM CURRENT USE OF ANTITHROMBOTICS/ANTIPLATELETS: ICD-10-CM

## 2023-07-20 DIAGNOSIS — R15.9 FULL INCONTINENCE OF FECES: ICD-10-CM

## 2023-07-20 PROCEDURE — 99214 OFFICE O/P EST MOD 30 MIN: CPT | Performed by: INTERNAL MEDICINE

## 2023-07-20 RX ORDER — FAMOTIDINE 40 MG/1
40 TABLET, FILM COATED ORAL
Qty: 30 TABLET | Refills: 5 | Status: SHIPPED | OUTPATIENT
Start: 2023-07-20

## 2023-07-20 RX ORDER — LOPERAMIDE HYDROCHLORIDE 2 MG/1
2 CAPSULE ORAL EVERY MORNING
Qty: 30 CAPSULE | Refills: 5 | Status: SHIPPED | OUTPATIENT
Start: 2023-07-20

## 2023-07-20 NOTE — PROGRESS NOTES
[facility] - Outpatient Follow-up Note  Allan Gordon 82 y.o. female MRN: [id number]  Encounter: [id number]    ASSESSMENT AND PLAN:      1. Esophageal dysphagia  EGD/Dil 4/2023 w some improvement but still w occ symptoms - AM voice hoarseness - will try Pepcid at night at see if this helps. - famotidine (PEPCID) 40 MG tablet; Take 1 tablet (40 mg total) by mouth daily at bedtime  Dispense: 30 tablet; Refill: 5    2. Full incontinence of feces  Improved. Doing pelvic PT, limiting dairy, and taking imodium 2 times a week, stopped citracel. 3. Irritable bowel syndrome with both constipation and diarrhea    - loperamide (IMODIUM) 2 mg capsule; Take 1 capsule (2 mg total) by mouth every morning PRN  Dispense: 30 capsule; Refill: 5    4. History of colon polyps  No further recall    5. Long term current use of antithrombotics/antiplatelets  On plavix for h/o TIA      Followup Appointment: 3 months  ______________________________________________________________________    Chief Complaint   Patient presents with   • Irritable Bowel Syndrome     Doing better     HPI:  82F here today for f/u. Doing well. Imodium really helps - only using it every 2-3 days. No accidents. Did pelvic PT a couple times a week. Still w some sensation of dysphagia. egd and dil was otherwise unremarkable. Likely from presbyesophagus, poss reflux although no obvious heartburn or regurgitation.     Historical Information   Past Medical History:   Diagnosis Date   • Colon polyp    • Glaucoma    • Hyperlipidemia    • Irritable bowel syndrome    • S/P dilatation of esophageal stricture    • TIA (transient ischemic attack)      Past Surgical History:   Procedure Laterality Date   • ANKLE FRACTURE SURGERY Right    • APPENDECTOMY     • CATARACT EXTRACTION Left    • CHOLECYSTECTOMY     • COLONOSCOPY     • ELBOW FRACTURE SURGERY Left    • TONSILLECTOMY     • UPPER GASTROINTESTINAL ENDOSCOPY     • WRIST FRACTURE SURGERY Left      Social History     Substance and Sexual Activity   Alcohol Use Not Currently     Social History     Substance and Sexual Activity   Drug Use Never     Social History     Tobacco Use   Smoking Status Never   Smokeless Tobacco Never     Family History   Adopted: Yes   Problem Relation Age of Onset   • Colon cancer Neg Hx    • Colon polyps Neg Hx          Current Outpatient Medications:   •  acetaminophen (TYLENOL) 500 mg tablet  •  Albuterol Sulfate (ProAir RespiClick) 359 (90 Base) MCG/ACT AEPB  •  Cholecalciferol (Vitamin D) 50 MCG (2000 UT) CAPS  •  clopidogrel (PLAVIX) 75 mg tablet  •  Coenzyme Q10 (CoQ10) 100 MG CAPS  •  ezetimibe (ZETIA) 10 mg tablet  •  famotidine (PEPCID) 40 MG tablet  •  ibandronate (BONIVA) 150 MG tablet  •  loperamide (IMODIUM) 2 mg capsule  •  LORazepam (ATIVAN) 0.5 mg tablet  •  losartan-hydrochlorothiazide (HYZAAR) 50-12.5 mg per tablet  •  niacin 500 mg tablet  •  sertraline (ZOLOFT) 50 mg tablet  •  simvastatin (ZOCOR) 40 mg tablet  Allergies   Allergen Reactions   • Amoxicillin-Pot Clavulanate GI Intolerance   • Aspirin Other (See Comments)     Doesn't set well in stomach   • Ibuprofen Swelling     Hands and feet   • Nexium [Esomeprazole] Myalgia   • Nortriptyline GI Intolerance     "heartburn"   • Prednisone Other (See Comments)     Hyper/insomnia/flushed     Reviewed medications and allergies and updated as indicated    PHYSICAL EXAM:    Blood pressure 105/52, height 5' 2" (1.575 m), weight 58.5 kg (129 lb). Body mass index is 23.59 kg/m². General Appearance: NAD, cooperative, alert  Eyes: Anicteric, PERRLA, EOMI  ENT:  Normocephalic, atraumatic, normal mucosa. Neck:  Supple, symmetrical, trachea midline  Resp:  Clear to auscultation bilaterally; no rales, rhonchi or wheezing; respirations unlabored   CV:  S1 S2, Regular rate and rhythm; no murmur, rub, or gallop.   GI:  Soft, non-tender, non-distended; normal bowel sounds; no masses, no organomegaly   Rectal: Deferred  Musculoskeletal: No cyanosis, clubbing or edema. Normal ROM. Skin:  No jaundice, rashes, or lesions   Heme/Lymph: No palpable cervical lymphadenopathy  Psych: Normal affect, good eye contact  Neuro: No gross deficits, AAOx3    Lab Results:   No results found for: "WBC", "HGB", "HCT", "MCV", "PLT"  No results found for: "NA", "K", "CL", "CO2", "ANIONGAP", "BUN", "CREATININE", "GLUCOSE", "GLUF", "CALCIUM", "CORRECTEDCA", "AST", "ALT", "ALKPHOS", "PROT", "BILITOT", "EGFR"  No results found for: "IRON", "TIBC", "FERRITIN"  No results found for: "LIPASE"    Radiology Results:   No results found.

## 2023-08-13 DIAGNOSIS — R13.19 ESOPHAGEAL DYSPHAGIA: ICD-10-CM

## 2023-08-14 RX ORDER — FAMOTIDINE 40 MG/1
40 TABLET, FILM COATED ORAL
Qty: 90 TABLET | Refills: 0 | Status: SHIPPED | OUTPATIENT
Start: 2023-08-14

## 2023-10-09 ENCOUNTER — OFFICE VISIT (OUTPATIENT)
Dept: GASTROENTEROLOGY | Facility: CLINIC | Age: 83
End: 2023-10-09
Payer: COMMERCIAL

## 2023-10-09 VITALS
WEIGHT: 128.4 LBS | HEIGHT: 62 IN | BODY MASS INDEX: 23.63 KG/M2 | DIASTOLIC BLOOD PRESSURE: 63 MMHG | SYSTOLIC BLOOD PRESSURE: 136 MMHG

## 2023-10-09 DIAGNOSIS — R13.19 ESOPHAGEAL DYSPHAGIA: Primary | ICD-10-CM

## 2023-10-09 DIAGNOSIS — Z79.02 LONG TERM CURRENT USE OF ANTITHROMBOTICS/ANTIPLATELETS: ICD-10-CM

## 2023-10-09 DIAGNOSIS — R15.9 FULL INCONTINENCE OF FECES: ICD-10-CM

## 2023-10-09 PROCEDURE — 99214 OFFICE O/P EST MOD 30 MIN: CPT | Performed by: INTERNAL MEDICINE

## 2023-10-09 RX ORDER — FAMOTIDINE 40 MG/1
40 TABLET, FILM COATED ORAL
Qty: 30 TABLET | Refills: 5 | Status: SHIPPED | OUTPATIENT
Start: 2023-10-09

## 2023-10-09 RX ORDER — TIMOLOL MALEATE 5 MG/ML
SOLUTION/ DROPS OPHTHALMIC
COMMUNITY
Start: 2023-07-27

## 2023-10-09 RX ORDER — OMEPRAZOLE 40 MG/1
40 CAPSULE, DELAYED RELEASE ORAL DAILY
Qty: 30 CAPSULE | Refills: 2 | Status: SHIPPED | OUTPATIENT
Start: 2023-10-09

## 2023-10-09 RX ORDER — DORZOLAMIDE HYDROCHLORIDE AND TIMOLOL MALEATE 20; 5 MG/ML; MG/ML
SOLUTION/ DROPS OPHTHALMIC
COMMUNITY

## 2023-10-09 NOTE — PATIENT INSTRUCTIONS
Omeprazole in the morning. Continue Pepcid at night. Nothing by mouth (not even water) after 6:30pm.   Drink at least 4 bottles of water a day. Follow up with ENT and Speech Evaluation.

## 2023-10-09 NOTE — PROGRESS NOTES
[facility] - Outpatient Follow-up Note  Brennon Garnica 82 y.o. female MRN: [id number]  Encounter: [id number]    ASSESSMENT AND PLAN:      1. Esophageal dysphagia  83F here with her  for follow up. EGD/DIL 4/2023 w min improvement. Pepcid 40mg at night did not help. Still w sig dryness of mouth, dysphagia, hoarseness of voice. Sounds like some of it is secondary to some presbyesophagus with reflux so we will maximize acid therapy and GERD lifestyle modifications. Additionally, sounds like her voice is affected likely from dry mouth (opens her mouth when she sleeps not to mention she does not drink enough water). - Increase water intake  - Referral for ENT to assess vocal cords given hoarseness of voice  - Speech Eval  - Ambulatory Referral to Otolaryngology; Future  - FL barium swallow video w speech; Future  - omeprazole (PriLOSEC) 40 MG capsule; Take 1 capsule (40 mg total) by mouth daily  Dispense: 30 capsule; Refill: 2  - famotidine (PEPCID) 40 MG tablet; Take 1 tablet (40 mg total) by mouth daily at bedtime  Dispense: 30 tablet; Refill: 5    2. Full incontinence of feces  Improved after pelvic PT. Only uses imodium prn now. 3. Long term current use of antithrombotics/antiplatelets  On plavix for h/o TIA      Followup Appointment: 3 months  ______________________________________________________________________    Chief Complaint   Patient presents with   • Difficulty Swallowing     Worsening. Raspy voic       HPI:  83F here today for f/u. Still w trouble swallowing. Raspy voice in the AM. Feels like her mouth is constantly dry. Food seems to be getting hung up. She can even try drinking water and at times feels like she goes into coughing fits.      Historical Information   Past Medical History:   Diagnosis Date   • Colon polyp    • Glaucoma    • Hyperlipidemia    • Irritable bowel syndrome    • S/P dilatation of esophageal stricture    • TIA (transient ischemic attack)      Past Surgical History:   Procedure Laterality Date   • ANKLE FRACTURE SURGERY Right    • APPENDECTOMY     • CATARACT EXTRACTION Left    • CHOLECYSTECTOMY     • COLONOSCOPY     • ELBOW FRACTURE SURGERY Left    • TONSILLECTOMY     • UPPER GASTROINTESTINAL ENDOSCOPY     • WRIST FRACTURE SURGERY Left      Social History     Substance and Sexual Activity   Alcohol Use Not Currently     Social History     Substance and Sexual Activity   Drug Use Never     Social History     Tobacco Use   Smoking Status Never   Smokeless Tobacco Never     Family History   Adopted: Yes   Problem Relation Age of Onset   • Colon cancer Neg Hx    • Colon polyps Neg Hx          Current Outpatient Medications:   •  acetaminophen (TYLENOL) 500 mg tablet  •  Albuterol Sulfate (ProAir RespiClick) 479 (90 Base) MCG/ACT AEPB  •  Cholecalciferol (Vitamin D) 50 MCG (2000 UT) CAPS  •  clopidogrel (PLAVIX) 75 mg tablet  •  Coenzyme Q10 (CoQ-10) 100 MG CAPS  •  Coenzyme Q10 (CoQ10) 100 MG CAPS  •  dorzolamide-timolol (COSOPT) 22.3-6.8 MG/ML ophthalmic solution  •  famotidine (PEPCID) 40 MG tablet  •  ibandronate (BONIVA) 150 MG tablet  •  loperamide (IMODIUM) 2 mg capsule  •  niacin 500 mg tablet  •  omeprazole (PriLOSEC) 40 MG capsule  •  simvastatin (ZOCOR) 40 mg tablet  •  timolol (TIMOPTIC) 0.5 % ophthalmic solution  •  ezetimibe (ZETIA) 10 mg tablet  •  LORazepam (ATIVAN) 0.5 mg tablet  •  losartan-hydrochlorothiazide (HYZAAR) 50-12.5 mg per tablet  •  sertraline (ZOLOFT) 50 mg tablet  Allergies   Allergen Reactions   • Amoxicillin-Pot Clavulanate GI Intolerance   • Aspirin Other (See Comments)     Doesn't set well in stomach   • Ibuprofen Swelling     Hands and feet   • Nexium [Esomeprazole] Myalgia   • Nortriptyline GI Intolerance     "heartburn"   • Prednisone Other (See Comments)     Hyper/insomnia/flushed     Reviewed medications and allergies and updated as indicated    PHYSICAL EXAM:    Blood pressure 136/63, height 5' 2" (1.575 m), weight 58.2 kg (128 lb 6.4 oz). Body mass index is 23.48 kg/m². General Appearance: NAD, cooperative, alert  Eyes: Anicteric, PERRLA, EOMI  ENT:  Normocephalic, atraumatic, normal mucosa. Neck:  Supple, symmetrical, trachea midline  Resp:  Clear to auscultation bilaterally; no rales, rhonchi or wheezing; respirations unlabored   CV:  S1 S2, Regular rate and rhythm; no murmur, rub, or gallop. GI:  Soft, non-tender, non-distended; normal bowel sounds; no masses, no organomegaly   Rectal: Deferred  Musculoskeletal: No cyanosis, clubbing or edema. Normal ROM. Skin:  No jaundice, rashes, or lesions   Heme/Lymph: No palpable cervical lymphadenopathy  Psych: Normal affect, good eye contact  Neuro: No gross deficits, AAOx3    Lab Results:   No results found for: "WBC", "HGB", "HCT", "MCV", "PLT"  No results found for: "NA", "K", "CL", "CO2", "ANIONGAP", "BUN", "CREATININE", "GLUCOSE", "GLUF", "CALCIUM", "CORRECTEDCA", "AST", "ALT", "ALKPHOS", "PROT", "BILITOT", "EGFR"  No results found for: "IRON", "TIBC", "FERRITIN"  No results found for: "LIPASE"    Radiology Results:   No results found.

## 2023-10-23 DIAGNOSIS — R13.19 ESOPHAGEAL DYSPHAGIA: ICD-10-CM

## 2023-10-23 RX ORDER — OMEPRAZOLE 40 MG/1
40 CAPSULE, DELAYED RELEASE ORAL DAILY
Qty: 90 CAPSULE | Refills: 1 | Status: SHIPPED | OUTPATIENT
Start: 2023-10-23

## 2023-11-16 DIAGNOSIS — R13.19 ESOPHAGEAL DYSPHAGIA: ICD-10-CM

## 2023-11-16 RX ORDER — FAMOTIDINE 40 MG/1
40 TABLET, FILM COATED ORAL
Qty: 30 TABLET | Refills: 5 | Status: SHIPPED | OUTPATIENT
Start: 2023-11-16

## 2023-11-16 NOTE — TELEPHONE ENCOUNTER
Reason for call:   [x] Refill   [] Prior Auth  [] Other:     Office:   [] PCP/Provider -   [x] Specialty/Provider - GI/Shin    Medication: Famotidine 40mg    Dose/Frequency: take 1 tablet at bedtime    Quantity: 30    Pharmacy: [facility] [address]    Does the patient have enough for 3 days?    [x] Yes   [] No - Send as HP to POD

## 2023-11-20 ENCOUNTER — TELEPHONE (OUTPATIENT)
Age: 83
End: 2023-11-20

## 2023-11-20 NOTE — TELEPHONE ENCOUNTER
Patients GI provider:  Dr. Sabine Arellano    Number to return call: (    Reason for call: Pt calling  needed the number to Speech Therapy provided the CS number to pt to fabiola    Scheduled procedure/appointment date if applicable: Apt/procedure

## 2023-11-22 ENCOUNTER — HOSPITAL ENCOUNTER (OUTPATIENT)
Dept: RADIOLOGY | Facility: HOSPITAL | Age: 83
Discharge: HOME | End: 2023-11-22
Attending: INTERNAL MEDICINE
Payer: COMMERCIAL

## 2023-11-22 ENCOUNTER — APPOINTMENT (OUTPATIENT)
Dept: RADIOLOGY | Facility: HOSPITAL | Age: 83
End: 2023-11-22
Payer: COMMERCIAL

## 2023-11-22 DIAGNOSIS — R13.19 ESOPHAGEAL DYSPHAGIA: ICD-10-CM

## 2023-11-22 PROCEDURE — 92611 MOTION FLUOROSCOPY/SWALLOW: CPT

## 2023-11-22 PROCEDURE — 74230 X-RAY XM SWLNG FUNCJ C+: CPT

## 2023-11-22 NOTE — PROCEDURES
Speech Pathology - Modified Barium Swallow Study    Patient Name: Lesley Chaudhry    YPCKU'K Date: 11/22/2023     Problem List  Active Problems: There are no active Hospital Problems. Past Medical History  Past Medical History:   Diagnosis Date    Arthritis     Colon polyp     GERD (gastroesophageal reflux disease)     Glaucoma     Hyperlipidemia     Irritable bowel syndrome     S/P dilatation of esophageal stricture     Stroke (HCC)     TIA (transient ischemic attack)        Past Surgical History  Past Surgical History:   Procedure Laterality Date    ANKLE FRACTURE SURGERY Right     APPENDECTOMY      CATARACT EXTRACTION Left     CHOLECYSTECTOMY      COLONOSCOPY      ELBOW FRACTURE SURGERY Left     GALLBLADDER SURGERY      TONSILLECTOMY      TUBAL LIGATION      UPPER GASTROINTESTINAL ENDOSCOPY      WRIST FRACTURE SURGERY Left        Assessment Summary:    Pt presents with  min-mild  oropharyngeal dysphagia. Reduced oral control w/ premature spill into pharynx. Swallow initiation is delayed w/ bolus head reaching the pyriforms. Reduced anterior hyoid excursion though complete laryngeal elevation and vestibule closure. Min-mild pharyngea retention cleared w/ secondary swallows. No penetration or aspiration on today's study. Brief screening of the esophagus revealed retention and pill stasis at the mid esophagus. Note: Images are available for review in PACS as desired. Recommendations:   Recommended Diet: regular diet and thin liquids   Recommended Form of Medications: whole with liquid   Aspiration precautions and compensatory swallowing strategies: upright posture, only feed when fully alert, and alternating bites and sips  Consider referral to:  -   SLP Dysphagia therapy recommended: Consider ST f/u for voice therapy if pt desired     Pt/Family Education: MBS findings and recommendations immediately reviewed w/ pt w/ use of images to aid in understanding.  Education provided on strategies to optimize swallow safety, including the importance of oral care and s/s aspiration to monitor for and notify medical team of should they arise. Pt verbalized understanding and denied questions at this time. Patient Stated Goal: "It just really feels like something is in there"         General Information;  Pt is a 82 y.o. female referred by gastroenterology. Per referring provider "EGD/DIL 4/2023 w min improvement. Pepcid 40mg at night did not help. Still w sig dryness of mouth, dysphagia, hoarseness of voice. Sounds like some of it is secondary to some presbyesophagus with reflux so we will maximize acid therapy and GERD lifestyle modifications. Additionally, sounds like her voice is affected likely from dry mouth (opens her mouth when she sleeps not to mention she does not drink enough water). "    Current concerns for dysphagia include " Raspy voice in the AM. Feels like her mouth is constantly dry. Food seems to be getting hung up. She can even try drinking water and at times feels like she goes into coughing fits."  MBS was recommended to assess oropharyngeal stage swallowing skills at this time. Prior MBS: Pt states she may have had one completed years ago at [facility], records not available     Oral Mechanism Exam  Facial: symmetrical  Labial: WFL  Lingual: WFL  Velum: symmetrical  Mandible: adequate ROM  Dentition: adequate  Vocal quality: hoarse   Volitional Cough: strong/productive   Respiratory Status: on RA      Pt was viewed sitting upright in the lateral and AP positions. Trials administered were consistent with MBSImP Validated Protocol: Pt was given 5-mL thin liquid x2, 20-mL cup sip thin, 40-mL sequential swallow thin, 5-mL nectar thick, 20-mL cup sip nectar thick, 40-mL sequential swallow nectar thick, 5-mL honey thick, 5-mL pudding, ½ cookie coated with 3-mL pudding, 5-mL nectar thick in the AP position, and 5-mL pudding in the AP position.  Pt was also given thin liquids by straw, as well as a barium tablet with thin liquid. Initial view observations/comments: Clear view of the upper airway      8-Point Penetration-Aspiration Scale   Thin liquid 1 - Material does not enter the airway   Nectar thick liquid 1 - Material does not enter the airway   Honey thick liquid 1 - Material does not enter the airway   Puree (pudding) 1 - Material does not enter the airway   Solid 1 - Material does not enter the airway     Strategies and Efficacy: -     Aspiration Response and Efficacy:  -     MBS IMP Rating    ORAL Impairment  Compinent 1--Lip Closure  Judged at any point during the swallow. 0 - No labial escape    Component 2--Tongue Control During Bolus Hold  Judged on held liquid boluses only and prior to productive tongue movement. 2 - Posterior escape of less than half of bolus    Component 3--Bolus Preparation/Mastication  Judged only during presentation of 1/2 shortbread cookie coated in pudding. 0 - Timely and efficient chewing and mashing    Component 4--Bolus Transport/Lingual Motion  Judged after first productive tongue movement for oral bolus transport. 3 - Repetitive/disorganized tongue motion    Component 5--Oral Residue  Judged after first swallow or after the last swallow of the sequential swallow task. 1 - Trace residue lining oral structures   Location   B - Palate and C - Tongue    Component 6--Initiation of Pharyngeal Swallow  Judged at first movement of the brisk superior-anterior hyoid trajectory. 3 - Bolus head in pyriforms      PHARYNGEAL Impairment  Component 7--Soft Palate Elevation  Judged during maximum displacement of soft palate. 0 - No bolus between the soft palate (SP)/pharyngeal wall (PW)    Component 8--Laryngeal Elevation  Judged when epiglottis is in its most horizontal position.   1 - Partial superior movement of thyroid cartilage/partial approximation of arytenoids to epiglottic petiole    Component 9--Anterior Hyoid Excursion  Judged at height of swallow/maximal anterior hyoid displacement. 0 - Complete anterior movement    Component 10--Epiglottic Movement  Judged at height of swallow/maximal anterior hyoid displacement. 0 - Complete inversion    Component 11--Laryngeal Vestibular Closure  Judged at height of swallow/maximal anterior hyoid displacement. 0 - Complete; no air/contrast in laryngeal vestibule    Component 12--Pharyngeal Stripping Wave  Judged during the full duration of the pharyngeal swallow. 1 - Present - diminished    Component 13--Pharyngeal Contraction  Judged in AP view at rest and throughout maximum movement of structures. 0 - Complete    Component 14--Pharyngoesophageal Segment Opening  Judged during maximum distension of PES and throughout opening and closure. 0 - Complete distension and complete duration; no obstruction of flow    Component 15--Tongue Base (TB) Retraction  Judged during maximum retraction of the tongue base. 0 - No contrast between TB and posterior pharyngeal wall (PW)    Component 16--Pharyngeal Residue  Judged after first swallow or after the last swallow of the sequential swallow task.   1 - Trace residue within or on pharyngeal structures   Location   A - Tongue Base, B - Valleculae, and E - Pyriform sinuses      ESOPHAGEAL Impairment  Component 17--Esophageal Clearance Upright Position  Judged in AP view during bolus transit through the oral cavity to the LES  1 - Esophageal retention

## 2023-12-20 DIAGNOSIS — R13.19 ESOPHAGEAL DYSPHAGIA: ICD-10-CM

## 2023-12-20 RX ORDER — FAMOTIDINE 40 MG/1
40 TABLET, FILM COATED ORAL
Qty: 90 TABLET | Refills: 1 | Status: SHIPPED | OUTPATIENT
Start: 2023-12-20

## 2023-12-27 ENCOUNTER — TELEPHONE (OUTPATIENT)
Age: 83
End: 2023-12-27

## 2023-12-27 NOTE — TELEPHONE ENCOUNTER
Patient is calling because Pharmacy is giving her a hard time with refilling her famotidine. She has two pills left but they are telling her it is too soon. Was last filled for 30day supply     Pharmacy [phone]

## 2023-12-27 NOTE — TELEPHONE ENCOUNTER
Spoke to pharmacy-they did receive new prescription sent last week for famotidine 40 mg daily.  Prescription has been filled and is waiting for patient  Talk to patient to inform her medication is ready at her pharmacy

## 2024-01-04 ENCOUNTER — OFFICE VISIT (OUTPATIENT)
Dept: GASTROENTEROLOGY | Facility: CLINIC | Age: 84
End: 2024-01-04
Payer: COMMERCIAL

## 2024-01-04 VITALS
DIASTOLIC BLOOD PRESSURE: 80 MMHG | BODY MASS INDEX: 23.74 KG/M2 | WEIGHT: 129 LBS | SYSTOLIC BLOOD PRESSURE: 138 MMHG | HEIGHT: 62 IN

## 2024-01-04 DIAGNOSIS — R14.2 BELCHING: ICD-10-CM

## 2024-01-04 DIAGNOSIS — R13.19 ESOPHAGEAL DYSPHAGIA: ICD-10-CM

## 2024-01-04 DIAGNOSIS — Z79.02 LONG TERM CURRENT USE OF ANTITHROMBOTICS/ANTIPLATELETS: ICD-10-CM

## 2024-01-04 DIAGNOSIS — K58.2 IRRITABLE BOWEL SYNDROME WITH BOTH CONSTIPATION AND DIARRHEA: Primary | ICD-10-CM

## 2024-01-04 PROCEDURE — 99214 OFFICE O/P EST MOD 30 MIN: CPT | Performed by: INTERNAL MEDICINE

## 2024-01-04 RX ORDER — FAMOTIDINE 40 MG/1
40 TABLET, FILM COATED ORAL 2 TIMES DAILY
Qty: 60 TABLET | Refills: 0 | Status: SHIPPED | OUTPATIENT
Start: 2024-01-04

## 2024-01-04 NOTE — PROGRESS NOTES
[facility] - Outpatient Follow-up Note  Vonnie Villegas 83 y.o. female MRN: [id number]  Encounter: [id number]    ASSESSMENT AND PLAN:      1. Irritable bowel syndrome with both constipation and diarrhea  83-year-old female here today for follow-up.  Was in the ER for constipation after taking multiple Imodium's.  We discussed taking the Imodium only when she has diarrhea.  Currently she is having no issues so we will continue to monitor especially as she has her wrist surgery coming up as well as her bus tour to [place].    2. Esophageal dysphagia  Resolved.  Will continue to follow.    - famotidine (PEPCID) 40 MG tablet; Take 1 tablet (40 mg total) by mouth 2 (two) times a day  Dispense: 60 tablet; Refill: 0    3. Belching  Overall doing well but she does have ongoing belching issues.  Discussed aerophagia precautions.  Will do a trial of Pepcid twice a day for 4 weeks and see if this even helps.    4. Long term current use of antithrombotics/antiplatelets  On Plavix      Followup Appointment: 4 weeks  ______________________________________________________________________    Chief Complaint   Patient presents with   • Follow-up     Pt was in ER 12/28/23 for constipation     HPI: 83-year-old female here today for follow-up.  Was in the emergency room during the holidays for constipation.  She states that she was feeling good on the Imodium regarding her diarrhea as well as taking it every day.  She did not realize that she was not moving her bowels until about 5 days then.  She was disimpacted in the ER and now feels great.  No longer on the Imodium denies any issues with dysphagia or heartburn.  Her only complaint is that she still has some intermittent belching issues which she thinks is related to her reflux.  Weight is stable otherwise.  Eating well.    Historical Information   Past Medical History:   Diagnosis Date   • Arthritis    • Colon polyp    • GERD (gastroesophageal  reflux disease)    • Glaucoma    • Hyperlipidemia    • Irritable bowel syndrome    • S/P dilatation of esophageal stricture    • Stroke (HCC)    • TIA (transient ischemic attack)      Past Surgical History:   Procedure Laterality Date   • ANKLE FRACTURE SURGERY Right    • APPENDECTOMY     • CATARACT EXTRACTION Left    • CHOLECYSTECTOMY     • COLONOSCOPY     • ELBOW FRACTURE SURGERY Left    • GALLBLADDER SURGERY     • TONSILLECTOMY     • TUBAL LIGATION     • UPPER GASTROINTESTINAL ENDOSCOPY     • WRIST FRACTURE SURGERY Left      Social History     Substance and Sexual Activity   Alcohol Use Not Currently     Social History     Substance and Sexual Activity   Drug Use Never     Social History     Tobacco Use   Smoking Status Never   Smokeless Tobacco Never     Family History   Adopted: Yes   Problem Relation Age of Onset   • Colon cancer Neg Hx    • Colon polyps Neg Hx          Current Outpatient Medications:   •  acetaminophen (TYLENOL) 500 mg tablet  •  Albuterol Sulfate (ProAir RespiClick) 108 (90 Base) MCG/ACT AEPB  •  Cholecalciferol (Vitamin D) 50 MCG (2000 UT) CAPS  •  clopidogrel (PLAVIX) 75 mg tablet  •  Coenzyme Q10 (CoQ-10) 100 MG CAPS  •  Coenzyme Q10 (CoQ10) 100 MG CAPS  •  diphenoxylate-atropine (Lomotil) 2.5-0.025 mg per tablet  •  dorzolamide-timolol (COSOPT) 22.3-6.8 MG/ML ophthalmic solution  •  famotidine (PEPCID) 40 MG tablet  •  ibandronate (BONIVA) 150 MG tablet  •  latanoprost (XALATAN) 0.005 % ophthalmic solution  •  loperamide (IMODIUM) 2 mg capsule  •  niacin 500 mg tablet  •  omeprazole (PriLOSEC) 40 MG capsule  •  Premarin vaginal cream  •  simvastatin (ZOCOR) 40 mg tablet  •  timolol (TIMOPTIC) 0.5 % ophthalmic solution  •  ezetimibe (ZETIA) 10 mg tablet  •  LORazepam (ATIVAN) 0.5 mg tablet  •  losartan-hydrochlorothiazide (HYZAAR) 50-12.5 mg per tablet  •  sertraline (ZOLOFT) 50 mg tablet  Allergies   Allergen Reactions   • Amoxicillin-Pot Clavulanate GI Intolerance   • Aspirin Other (See  "Comments)     Doesn't set well in stomach   • Ibuprofen Swelling     Hands and feet   • Nexium [Esomeprazole] Myalgia   • Nortriptyline GI Intolerance     \"heartburn\"   • Prednisone Other (See Comments)     Hyper/insomnia/flushed     Reviewed medications and allergies and updated as indicated    PHYSICAL EXAM:    Blood pressure 138/80, height 5' 2\" (1.575 m), weight 58.5 kg (129 lb). Body mass index is 23.59 kg/m².  General Appearance: NAD, cooperative, alert  Eyes: Anicteric, conjunctiva pink  ENT:  Normocephalic, atraumatic, normal mucosa.    Neck:  Supple, symmetrical, trachea midline  Resp:  Clear to auscultation bilaterally; no rales, rhonchi or wheezing; respirations unlabored   CV:  S1 S2, Regular rate and rhythm; no murmur, rub, or gallop.  GI:  Soft, non-tender, non-distended; normal bowel sounds; no masses, no organomegaly   Rectal: Deferred  Musculoskeletal: No cyanosis, clubbing or edema. Normal ROM.  Skin:  No jaundice, rashes, or lesions   Heme/Lymph: No palpable cervical lymphadenopathy  Psych: Normal affect, good eye contact  Neuro: No gross deficits, AAOx3    Lab Results:   No results found for: \"WBC\", \"HGB\", \"HCT\", \"MCV\", \"PLT\"  No results found for: \"NA\", \"K\", \"CL\", \"CO2\", \"ANIONGAP\", \"BUN\", \"CREATININE\", \"GLUCOSE\", \"GLUF\", \"CALCIUM\", \"CORRECTEDCA\", \"AST\", \"ALT\", \"ALKPHOS\", \"PROT\", \"BILITOT\", \"EGFR\"    Radiology Results:   No results found.    "

## 2024-02-28 ENCOUNTER — OFFICE VISIT (OUTPATIENT)
Dept: GASTROENTEROLOGY | Facility: CLINIC | Age: 84
End: 2024-02-28
Payer: COMMERCIAL

## 2024-02-28 VITALS
WEIGHT: 127 LBS | SYSTOLIC BLOOD PRESSURE: 118 MMHG | HEIGHT: 62 IN | DIASTOLIC BLOOD PRESSURE: 70 MMHG | BODY MASS INDEX: 23.37 KG/M2

## 2024-02-28 DIAGNOSIS — Z86.010 HISTORY OF COLON POLYPS: ICD-10-CM

## 2024-02-28 DIAGNOSIS — Z79.02 LONG TERM CURRENT USE OF ANTITHROMBOTICS/ANTIPLATELETS: ICD-10-CM

## 2024-02-28 DIAGNOSIS — K58.2 IRRITABLE BOWEL SYNDROME WITH BOTH CONSTIPATION AND DIARRHEA: Primary | ICD-10-CM

## 2024-02-28 DIAGNOSIS — R14.2 BELCHING: ICD-10-CM

## 2024-02-28 PROCEDURE — 99214 OFFICE O/P EST MOD 30 MIN: CPT | Performed by: INTERNAL MEDICINE

## 2024-02-28 PROCEDURE — G2211 COMPLEX E/M VISIT ADD ON: HCPCS | Performed by: INTERNAL MEDICINE

## 2024-02-28 RX ORDER — LOPERAMIDE HYDROCHLORIDE 2 MG/1
2 CAPSULE ORAL EVERY MORNING
Qty: 30 CAPSULE | Refills: 5 | Status: SHIPPED | OUTPATIENT
Start: 2024-02-28

## 2024-02-28 NOTE — PROGRESS NOTES
[facility] - Outpatient Follow-up Note  Vonnie Villegas 83 y.o. female MRN: [id number]  Encounter: [id number]    ASSESSMENT AND PLAN:      1. Irritable bowel syndrome with both constipation and diarrhea  83-year-old female here today for follow-up.  After getting constipated from Imodium and going to the ER, she has stopped the Imodium altogether and now is back to diarrhea.  Not every day.  I asked the patient to keep a log of how often she gets her diarrhea.  Depending on this, we can titrate the Imodium to comfort.    - loperamide (IMODIUM) 2 mg capsule; Take 1 capsule (2 mg total) by mouth every morning PRN  Dispense: 30 capsule; Refill: 5    2. Belching  Resolved.  No longer taking Pepcid or omeprazole at this point.  Will continue to follow.    3. Long term current use of antithrombotics/antiplatelets  On Plavix for history of TIA.    4. History of colon polyps  No further recall given advanced age.      Followup Appointment: 3 months  ______________________________________________________________________    Chief Complaint   Patient presents with   • Diarrhea     HPI: 83-year-old female here today for follow-up.  After going to the emergency room for constipation after taking Imodium, she has not been taking it at all.  Now is back to the diarrhea.  Especially when she is about to go out.  Makes her nervous and she has to often cancel her events.  Not every day.  No blood.  Weight is stable.    Historical Information   Past Medical History:   Diagnosis Date   • Arthritis    • Colon polyp    • GERD (gastroesophageal reflux disease)    • Glaucoma    • Hyperlipidemia    • Irritable bowel syndrome    • S/P dilatation of esophageal stricture    • Stroke (HCC)    • TIA (transient ischemic attack)      Past Surgical History:   Procedure Laterality Date   • ANKLE FRACTURE SURGERY Right    • APPENDECTOMY     • CATARACT EXTRACTION Left    • CHOLECYSTECTOMY     • COLONOSCOPY     •  "ELBOW FRACTURE SURGERY Left    • GALLBLADDER SURGERY     • TONSILLECTOMY     • TUBAL LIGATION     • UPPER GASTROINTESTINAL ENDOSCOPY     • WRIST FRACTURE SURGERY Left      Social History     Substance and Sexual Activity   Alcohol Use Not Currently     Social History     Substance and Sexual Activity   Drug Use Never     Social History     Tobacco Use   Smoking Status Never   Smokeless Tobacco Never     Family History   Adopted: Yes   Problem Relation Age of Onset   • Colon cancer Neg Hx    • Colon polyps Neg Hx          Current Outpatient Medications:   •  acetaminophen (TYLENOL) 500 mg tablet  •  Albuterol Sulfate (ProAir RespiClick) 108 (90 Base) MCG/ACT AEPB  •  Cholecalciferol (Vitamin D) 50 MCG (2000 UT) CAPS  •  clopidogrel (PLAVIX) 75 mg tablet  •  Coenzyme Q10 (CoQ10) 100 MG CAPS  •  dorzolamide-timolol (COSOPT) 22.3-6.8 MG/ML ophthalmic solution  •  ibandronate (BONIVA) 150 MG tablet  •  latanoprost (XALATAN) 0.005 % ophthalmic solution  •  loperamide (IMODIUM) 2 mg capsule  •  niacin 500 mg tablet  •  Premarin vaginal cream  •  simvastatin (ZOCOR) 40 mg tablet  •  timolol (TIMOPTIC) 0.5 % ophthalmic solution  Allergies   Allergen Reactions   • Amoxicillin-Pot Clavulanate GI Intolerance   • Aspirin Other (See Comments)     Doesn't set well in stomach   • Ibuprofen Swelling     Hands and feet   • Nexium [Esomeprazole] Myalgia   • Nortriptyline GI Intolerance     \"heartburn\"   • Prednisone Other (See Comments)     Hyper/insomnia/flushed     Reviewed medications and allergies and updated as indicated    PHYSICAL EXAM:    Blood pressure 118/70, height 5' 2\" (1.575 m), weight 57.6 kg (127 lb). Body mass index is 23.23 kg/m².  General Appearance: NAD, cooperative, alert  Eyes: Anicteric, conjunctiva pink  ENT:  Normocephalic, atraumatic, normal mucosa.    Neck:  Supple, symmetrical, trachea midline  Resp:  Clear to auscultation bilaterally; no rales, rhonchi or wheezing; respirations unlabored   CV:  S1 S2, " "Regular rate and rhythm; no murmur, rub, or gallop.  GI:  Soft, non-tender, non-distended; normal bowel sounds; no masses, no organomegaly   Rectal: Deferred  Musculoskeletal: No cyanosis, clubbing or edema. Normal ROM.  Skin:  No jaundice, rashes, or lesions   Heme/Lymph: No palpable cervical lymphadenopathy  Psych: Normal affect, good eye contact  Neuro: No gross deficits, AAOx3    Lab Results:   No results found for: \"WBC\", \"HGB\", \"HCT\", \"MCV\", \"PLT\"  No results found for: \"NA\", \"K\", \"CL\", \"CO2\", \"ANIONGAP\", \"BUN\", \"CREATININE\", \"GLUCOSE\", \"GLUF\", \"CALCIUM\", \"CORRECTEDCA\", \"AST\", \"ALT\", \"ALKPHOS\", \"PROT\", \"BILITOT\", \"EGFR\"    Radiology Results:   No results found.    "

## 2024-05-07 ENCOUNTER — TELEPHONE (OUTPATIENT)
Dept: ENDOCRINOLOGY | Facility: CLINIC | Age: 84
End: 2024-05-07

## 2024-05-07 DIAGNOSIS — K21.9 GASTROESOPHAGEAL REFLUX DISEASE WITHOUT ESOPHAGITIS: Primary | ICD-10-CM

## 2024-05-07 NOTE — TELEPHONE ENCOUNTER
Left voicemail for pt to call back to confirm she is taking Pepcid, according to 2/28 office visit pt was not taking.

## 2024-05-07 NOTE — TELEPHONE ENCOUNTER
Medication Refill Request     Name   famotidine (PEPCID) 40 MG tablet     Dose/Frequency 40 mg   Quantity 60 tablets   Verified pharmacy   [x]  Verified ordering Provider   [x]  Does patient have enough for the next 3 days? Yes [] No [x]

## 2024-05-08 RX ORDER — FAMOTIDINE 20 MG/1
20 TABLET, FILM COATED ORAL 2 TIMES DAILY
Qty: 60 TABLET | Refills: 5 | Status: SHIPPED | OUTPATIENT
Start: 2024-05-08

## 2024-05-08 NOTE — TELEPHONE ENCOUNTER
famotidine (PEPCID) 40 MG tablet [[id number]]  DISCONTINUED    Order Details  Dose: 40 mg Route: Oral Frequency: 2 times daily   Dispense Quantity: 60 tablet Refills: 0          Sig: Take 1 tablet (40 mg total) by mouth 2 (two) times a day         Start Date: 01/04/24 End Date: 02/28/24   Discontinued by: Kari Way MD on 2/28/2024 14:35         Written Date: 01/04/24 Expiration Date: 01/03/25       Associated Diagnoses: Esophageal dysphagia [R13.19]   Original Order: famotidine (PEPCID) 40 MG tablet [[id number]]   Providers    Authorizing Provider:  Kari Way MD  [address] [facility] [address]  Phone: [phone]   Fax: [phone]     Pt states that she will like a refill of medication due to reflux getting worse again. Please advise.

## 2024-06-01 DIAGNOSIS — K21.9 GASTROESOPHAGEAL REFLUX DISEASE WITHOUT ESOPHAGITIS: ICD-10-CM

## 2024-06-03 RX ORDER — FAMOTIDINE 20 MG/1
20 TABLET, FILM COATED ORAL 2 TIMES DAILY
Qty: 180 TABLET | Refills: 1 | Status: SHIPPED | OUTPATIENT
Start: 2024-06-03

## 2024-06-13 ENCOUNTER — OFFICE VISIT (OUTPATIENT)
Dept: GASTROENTEROLOGY | Facility: CLINIC | Age: 84
End: 2024-06-13
Payer: COMMERCIAL

## 2024-06-13 VITALS
WEIGHT: 128.6 LBS | DIASTOLIC BLOOD PRESSURE: 52 MMHG | HEIGHT: 62 IN | SYSTOLIC BLOOD PRESSURE: 102 MMHG | BODY MASS INDEX: 23.66 KG/M2

## 2024-06-13 DIAGNOSIS — K58.2 IRRITABLE BOWEL SYNDROME WITH BOTH CONSTIPATION AND DIARRHEA: Primary | ICD-10-CM

## 2024-06-13 DIAGNOSIS — G45.9 TIA (TRANSIENT ISCHEMIC ATTACK): ICD-10-CM

## 2024-06-13 DIAGNOSIS — R13.19 ESOPHAGEAL DYSPHAGIA: ICD-10-CM

## 2024-06-13 DIAGNOSIS — Z79.02 LONG TERM CURRENT USE OF ANTITHROMBOTICS/ANTIPLATELETS: ICD-10-CM

## 2024-06-13 DIAGNOSIS — Z86.010 HISTORY OF COLON POLYPS: ICD-10-CM

## 2024-06-13 DIAGNOSIS — K21.9 GASTROESOPHAGEAL REFLUX DISEASE WITHOUT ESOPHAGITIS: ICD-10-CM

## 2024-06-13 PROCEDURE — 99214 OFFICE O/P EST MOD 30 MIN: CPT | Performed by: INTERNAL MEDICINE

## 2024-06-13 PROCEDURE — G2211 COMPLEX E/M VISIT ADD ON: HCPCS | Performed by: INTERNAL MEDICINE

## 2024-06-13 RX ORDER — FAMOTIDINE 20 MG/1
20 TABLET, FILM COATED ORAL EVERY MORNING
Qty: 90 TABLET | Refills: 3 | Status: SHIPPED | OUTPATIENT
Start: 2024-06-13

## 2024-06-13 NOTE — PROGRESS NOTES
[facility] - Outpatient Follow-up Note  Vonnie Villegas 83 y.o. female MRN: [id number]  Encounter: [id number]    ASSESSMENT AND PLAN:      1. Gastroesophageal reflux disease without esophagitis  Doing well.     - famotidine (PEPCID) 20 mg tablet; Take 1 tablet (20 mg total) by mouth every morning  Dispense: 90 tablet; Refill: 3    2. Irritable bowel syndrome with both constipation and diarrhea  Doing much better. No longer on imodium.     3. TIA (transient ischemic attack)  On Plavix.     4. Long term current use of antithrombotics/antiplatelets    5. Esophageal dysphagia  Resolved.     6. History of colon polyps  No further recall      Followup Appointment: 1 year   ______________________________________________________________________    Chief Complaint   Patient presents with   • Follow-up     Discuss log of BM;s     HPI:  83F here today for f/u w . Doing much better. Brought a log of BMs. Only had two episodes of loose stools over the past 2 months. Otherwise normal. No longer on imodium. Healthy diet. Exercising regularly.    Historical Information   Past Medical History:   Diagnosis Date   • Arthritis    • Colon polyp    • GERD (gastroesophageal reflux disease)    • Glaucoma    • Hyperlipidemia    • Irritable bowel syndrome    • S/P dilatation of esophageal stricture    • Stroke (HCC)    • TIA (transient ischemic attack)      Past Surgical History:   Procedure Laterality Date   • ANKLE FRACTURE SURGERY Right    • APPENDECTOMY     • CATARACT EXTRACTION Left    • CHOLECYSTECTOMY     • COLONOSCOPY     • ELBOW FRACTURE SURGERY Left    • GALLBLADDER SURGERY     • TONSILLECTOMY     • TUBAL LIGATION     • UPPER GASTROINTESTINAL ENDOSCOPY     • WRIST FRACTURE SURGERY Left      Social History     Substance and Sexual Activity   Alcohol Use Not Currently     Social History     Substance and Sexual Activity   Drug Use Never     Social History     Tobacco Use   Smoking Status  "Never   Smokeless Tobacco Never     Family History   Adopted: Yes   Problem Relation Age of Onset   • Colon cancer Neg Hx    • Colon polyps Neg Hx          Current Outpatient Medications:   •  acetaminophen (TYLENOL) 500 mg tablet  •  Albuterol Sulfate (ProAir RespiClick) 108 (90 Base) MCG/ACT AEPB  •  Cholecalciferol (Vitamin D) 50 MCG (2000 UT) CAPS  •  clopidogrel (PLAVIX) 75 mg tablet  •  Coenzyme Q10 (CoQ10) 100 MG CAPS  •  famotidine (PEPCID) 20 mg tablet  •  ibandronate (BONIVA) 150 MG tablet  •  Premarin vaginal cream  •  sertraline (ZOLOFT) 50 mg tablet  •  simvastatin (ZOCOR) 40 mg tablet  •  timolol (TIMOPTIC) 0.5 % ophthalmic solution  Allergies   Allergen Reactions   • Amoxicillin-Pot Clavulanate GI Intolerance   • Aspirin Other (See Comments)     Doesn't set well in stomach   • Hydrocodone GI Intolerance   • Ibuprofen Swelling     Hands and feet   • Nexium [Esomeprazole] Myalgia   • Nortriptyline GI Intolerance     \"heartburn\"   • Prednisone Other (See Comments)     Hyper/insomnia/flushed     Reviewed medications and allergies and updated as indicated    PHYSICAL EXAM:    Blood pressure 102/52, height 5' 2\" (1.575 m), weight 58.3 kg (128 lb 9.6 oz). Body mass index is 23.52 kg/m².  General Appearance: NAD, cooperative, alert  Eyes: Anicteric, conjunctiva pink  ENT:  Normocephalic, atraumatic, normal mucosa.    Neck:  Supple, symmetrical, trachea midline  Resp:  Clear to auscultation bilaterally; no rales, rhonchi or wheezing; respirations unlabored   CV:  S1 S2, Regular rate and rhythm; no murmur, rub, or gallop.  GI:  Soft, non-tender, non-distended; normal bowel sounds; no masses, no organomegaly   Rectal: Deferred  Musculoskeletal: No cyanosis, clubbing or edema. Normal ROM.  Skin:  No jaundice, rashes, or lesions   Heme/Lymph: No palpable cervical lymphadenopathy  Psych: Normal affect, good eye contact  Neuro: No gross deficits, AAOx3    Lab Results:   No results found for: \"WBC\", \"HGB\", \"HCT\", " "\"MCV\", \"PLT\"  No results found for: \"NA\", \"K\", \"CL\", \"CO2\", \"ANIONGAP\", \"BUN\", \"CREATININE\", \"GLUCOSE\", \"GLUF\", \"CALCIUM\", \"CORRECTEDCA\", \"AST\", \"ALT\", \"ALKPHOS\", \"PROT\", \"BILITOT\", \"EGFR\"    Radiology Results:   No results found.    "

## 2024-09-23 ENCOUNTER — TELEPHONE (OUTPATIENT)
Age: 84
End: 2024-09-23

## 2024-09-23 NOTE — TELEPHONE ENCOUNTER
Patients GI provider:  Dr. Way    Number to return call: [phone]    Reason for call: Pt calling because she was at the ED this morning and was told to make an appt asap with  (in [facility]) because she needs an EGD. I don't have anything with the  til 11.25 or a PA/NP til 10.30 and Pt say she is not supposed to wait that long, she is asking if she can be seen sooner. Please advise.

## 2024-09-23 NOTE — TELEPHONE ENCOUNTER
I called patient, no answer, left message that there is a 15 minute appointment with Dr. Way  9/24/24 at [facility] and requested call back if she can take that. Patient seen [facility] ED this morning for swallowing difficulties worsening over past few weeks.  Patient last seen in June (she is on Plavix), last EGD 4/20/23 with dilation.

## 2024-09-24 NOTE — TELEPHONE ENCOUNTER
I spoke with patient, she is currently on vacation. Patient scheduled for visit 10/3/24 (15 minute available, last seen June 2024). Patient is keeping to soft foods and states she is taking the omeprazole and notes some improvement.  Patient would be agreeable to scheduling EGD vs ov if provider feels appropriate. I did confirm she is currently on Plavix. Please review/advise.

## 2024-09-25 ENCOUNTER — PREP FOR PROCEDURE (OUTPATIENT)
Dept: GASTROENTEROLOGY | Facility: CLINIC | Age: 84
End: 2024-09-25

## 2024-09-25 DIAGNOSIS — R13.19 ESOPHAGEAL DYSPHAGIA: Primary | ICD-10-CM

## 2024-09-25 NOTE — TELEPHONE ENCOUNTER
Scheduled date of EGD(as of today):10/10/2024  Physician performing EGD:Dr Way  Location of EGD:[facility]  Instructions emailed to patient by: sulaiman  Clearances: Plavix-pcp manages

## 2024-09-25 NOTE — TELEPHONE ENCOUNTER
Provider: Dr. Way  Procedure: EGD  Scheduled Date: 10/10/24  Location: [facility]  Medication(s) to stop: Plavis  Days to hold: 5  Last dose should be: 10/4/24  Requested from: AMY Yap  Date requested: 9/25/24

## 2024-09-26 ENCOUNTER — TELEPHONE (OUTPATIENT)
Age: 84
End: 2024-09-26

## 2024-10-02 NOTE — TELEPHONE ENCOUNTER
Marilynn called stated she is returning a call from [facility]. Warm transferred over to [facility]

## 2024-10-04 ENCOUNTER — TELEPHONE (OUTPATIENT)
Dept: GASTROENTEROLOGY | Facility: CLINIC | Age: 84
End: 2024-10-04

## 2024-10-04 NOTE — TELEPHONE ENCOUNTER
Procedure confirmed  Endoscopy     Via: Voice mail    Instructions given: Email     Prep Given: N/A    Call the office if there are any questions.      LVM for pt ok to hold plavix prior to procedure. Last dose is 10/4/24 (today) asked her to call back to confirm she received the message.

## 2024-10-08 NOTE — TELEPHONE ENCOUNTER
Pt calling to inform Dr. Way that she after she swallows food she burps. Pt just wanted the doctor to know before her procedure.

## 2024-10-10 ENCOUNTER — ANESTHESIA (OUTPATIENT)
Dept: GASTROENTEROLOGY | Facility: HOSPITAL | Age: 84
End: 2024-10-10
Payer: COMMERCIAL

## 2024-10-10 ENCOUNTER — HOSPITAL ENCOUNTER (OUTPATIENT)
Dept: GASTROENTEROLOGY | Facility: HOSPITAL | Age: 84
Setting detail: OUTPATIENT SURGERY
End: 2024-10-10
Attending: INTERNAL MEDICINE
Payer: COMMERCIAL

## 2024-10-10 ENCOUNTER — ANESTHESIA EVENT (OUTPATIENT)
Dept: GASTROENTEROLOGY | Facility: HOSPITAL | Age: 84
End: 2024-10-10
Payer: COMMERCIAL

## 2024-10-10 VITALS
DIASTOLIC BLOOD PRESSURE: 59 MMHG | SYSTOLIC BLOOD PRESSURE: 124 MMHG | TEMPERATURE: 97.8 F | RESPIRATION RATE: 20 BRPM | HEIGHT: 62 IN | WEIGHT: 124 LBS | BODY MASS INDEX: 22.82 KG/M2 | OXYGEN SATURATION: 94 % | HEART RATE: 58 BPM

## 2024-10-10 DIAGNOSIS — R13.19 ESOPHAGEAL DYSPHAGIA: ICD-10-CM

## 2024-10-10 DIAGNOSIS — K21.9 GASTROESOPHAGEAL REFLUX DISEASE WITHOUT ESOPHAGITIS: Primary | ICD-10-CM

## 2024-10-10 PROCEDURE — 88342 IMHCHEM/IMCYTCHM 1ST ANTB: CPT | Performed by: PATHOLOGY

## 2024-10-10 PROCEDURE — 88305 TISSUE EXAM BY PATHOLOGIST: CPT | Performed by: PATHOLOGY

## 2024-10-10 PROCEDURE — 43239 EGD BIOPSY SINGLE/MULTIPLE: CPT | Performed by: INTERNAL MEDICINE

## 2024-10-10 PROCEDURE — 43450 DILATE ESOPHAGUS 1/MULT PASS: CPT | Performed by: INTERNAL MEDICINE

## 2024-10-10 RX ORDER — OMEPRAZOLE 40 MG/1
40 CAPSULE, DELAYED RELEASE ORAL DAILY
Qty: 30 CAPSULE | Refills: 2 | Status: SHIPPED | OUTPATIENT
Start: 2024-10-10

## 2024-10-10 RX ORDER — PROPOFOL 10 MG/ML
INJECTION, EMULSION INTRAVENOUS AS NEEDED
Status: DISCONTINUED | OUTPATIENT
Start: 2024-10-10 | End: 2024-10-10

## 2024-10-10 RX ORDER — LIDOCAINE HYDROCHLORIDE 10 MG/ML
INJECTION, SOLUTION EPIDURAL; INFILTRATION; INTRACAUDAL; PERINEURAL AS NEEDED
Status: DISCONTINUED | OUTPATIENT
Start: 2024-10-10 | End: 2024-10-10

## 2024-10-10 RX ORDER — SODIUM CHLORIDE 9 MG/ML
INJECTION, SOLUTION INTRAVENOUS CONTINUOUS PRN
Status: DISCONTINUED | OUTPATIENT
Start: 2024-10-10 | End: 2024-10-10

## 2024-10-10 RX ADMIN — SODIUM CHLORIDE: 0.9 INJECTION, SOLUTION INTRAVENOUS at 10:27

## 2024-10-10 RX ADMIN — PROPOFOL 60 MG: 10 INJECTION, EMULSION INTRAVENOUS at 10:43

## 2024-10-10 RX ADMIN — PROPOFOL 20 MG: 10 INJECTION, EMULSION INTRAVENOUS at 10:45

## 2024-10-10 RX ADMIN — PROPOFOL 40 MG: 10 INJECTION, EMULSION INTRAVENOUS at 10:44

## 2024-10-10 RX ADMIN — LIDOCAINE HYDROCHLORIDE 50 MG: 10 INJECTION, SOLUTION EPIDURAL; INFILTRATION; INTRACAUDAL; PERINEURAL at 10:42

## 2024-10-10 RX ADMIN — PROPOFOL 100 MG: 10 INJECTION, EMULSION INTRAVENOUS at 10:42

## 2024-10-10 NOTE — ANESTHESIA PREPROCEDURE EVALUATION
Procedure:  EGD    Relevant Problems   GI/HEPATIC   (+) Esophageal dysphagia      NEURO/PSYCH   (+) TIA (transient ischemic attack)      Other   (+) Long term current use of antithrombotics/antiplatelets (Plavix stopped 6 days ago)        Physical Exam    Airway    Mallampati score: II  TM Distance: >3 FB  Neck ROM: full     Dental   No notable dental hx     Cardiovascular      Pulmonary      Other Findings  post-pubertal.      Anesthesia Plan  ASA Score- 3     Anesthesia Type- IV sedation with anesthesia with ASA Monitors.         Additional Monitors:     Airway Plan:            Plan Factors-Exercise tolerance (METS): >4 METS.    Chart reviewed. EKG reviewed.   Patient summary reviewed.    Patient is not a current smoker.              Induction- intravenous.    Postoperative Plan-         Informed Consent- Anesthetic plan and risks discussed with patient.  I personally reviewed this patient with the CRNA. Discussed and agreed on the Anesthesia Plan with the CRNA..

## 2024-10-10 NOTE — H&P
"History and Physical - [facility]    Vonnie Villegas 84 y.o. female MRN: [id number]      HPI: Vonnie Villegas is a 84 y.o. female who presents for dysphagia, Plavix was held.    Allergies   Allergen Reactions    Amoxicillin-Pot Clavulanate GI Intolerance    Aspirin Other (See Comments)     Doesn't set well in stomach    Hydrocodone GI Intolerance    Ibuprofen Swelling     Hands and feet    Nexium [Esomeprazole] Myalgia    Nortriptyline GI Intolerance     \"heartburn\"    Prednisone Other (See Comments)     Hyper/insomnia/flushed         REVIEW OF SYSTEMS: Per the HPI, and otherwise unremarkable.    Historical Information     Past Medical History:   Diagnosis Date    Arthritis     Colon polyp     GERD (gastroesophageal reflux disease)     Glaucoma     Hyperlipidemia     Irritable bowel syndrome     S/P dilatation of esophageal stricture     Stroke (HCC)     TIA (transient ischemic attack)      Past Surgical History:   Procedure Laterality Date    ANKLE FRACTURE SURGERY Right     APPENDECTOMY      CATARACT EXTRACTION Left     CHOLECYSTECTOMY      COLONOSCOPY      ELBOW FRACTURE SURGERY Left     GALLBLADDER SURGERY      TONSILLECTOMY      TUBAL LIGATION      UPPER GASTROINTESTINAL ENDOSCOPY      WRIST FRACTURE SURGERY Left      Social History   Social History     Substance and Sexual Activity   Alcohol Use Not Currently     Social History     Substance and Sexual Activity   Drug Use Never     Social History     Tobacco Use   Smoking Status Never   Smokeless Tobacco Never     Family History   Adopted: Yes   Problem Relation Age of Onset    Colon cancer Neg Hx     Colon polyps Neg Hx        Meds/Allergies       Current Outpatient Medications:     acetaminophen (TYLENOL) 500 mg tablet    Albuterol Sulfate (ProAir RespiClick) 108 (90 Base) MCG/ACT AEPB    Cholecalciferol (Vitamin D) 50 MCG (2000 UT) CAPS    clopidogrel (PLAVIX) 75 mg tablet    Coenzyme Q10 (CoQ10) 100 MG CAPS    ibandronate " "(BONIVA) 150 MG tablet    simvastatin (ZOCOR) 40 mg tablet    timolol (TIMOPTIC) 0.5 % ophthalmic solution    famotidine (PEPCID) 20 mg tablet    Premarin vaginal cream    sertraline (ZOLOFT) 50 mg tablet        Objective     /65   Pulse 59   Temp 97.8 °F (36.6 °C) (Tympanic)   Resp 20   Ht 5' 2\" (1.575 m)   Wt 56.2 kg (124 lb)   SpO2 100%   BMI 22.68 kg/m²       PHYSICAL EXAM    Gen: NAD AAOx3  Head: Normocephalic, Atraumatic  CV: S1S2 RRR no m/r/g  CHEST: Clear b/l no c/r/w  ABD: soft, +BS NT/ND no masses  EXT: no edema      ASSESSMENT/PLAN:  This is a 84 y.o. year old female here for EGD +/- dilation, and she is stable and optimized for her procedure.        "

## 2024-10-10 NOTE — ANESTHESIA POSTPROCEDURE EVALUATION
Post-Op Assessment Note    CV Status:  Stable    Pain management: adequate       Mental Status:  Alert and awake   Hydration Status:  Euvolemic   PONV Controlled:  Controlled   Airway Patency:  Patent     Post Op Vitals Reviewed: Yes    No anethesia notable event occurred.    Staff: Anesthesiologist           Last Filed PACU Vitals:  Vitals Value Taken Time   Temp 97.8 °F (36.6 °C) 10/10/24 1120   Pulse 58 10/10/24 1120   /59 10/10/24 1120   Resp 20 10/10/24 1120   SpO2 94 % 10/10/24 1120       Modified Magen:  Activity: 2 (10/10/2024 11:20 AM)  Respiration: 2 (10/10/2024 11:20 AM)  Circulation: 2 (10/10/2024 11:20 AM)  Consciousness: 2 (10/10/2024 11:20 AM)  Oxygen Saturation: 2 (10/10/2024 11:20 AM)  Modified Magen Score: 10 (10/10/2024 11:20 AM)

## 2024-10-10 NOTE — ANESTHESIA POSTPROCEDURE EVALUATION
Post-Op Assessment Note    CV Status:  Stable  Pain Score: 0    Pain management: adequate       Mental Status:  Alert and awake   Hydration Status:  Euvolemic   PONV Controlled:  Controlled   Airway Patency:  Patent     Post Op Vitals Reviewed: Yes    No anethesia notable event occurred.    Staff: Anesthesiologist, with CRNAs       Last Filed PACU Vitals:  Vitals Value Taken Time   Temp 97.3 °F (36.3 °C) 10/10/24 1052   Pulse 64 10/10/24 1052   /54 10/10/24 1052   Resp 16 10/10/24 1052   SpO2 98 % 10/10/24 1052       Modified Magen:  Activity: 0 (10/10/2024 10:52 AM)  Respiration: 1 (10/10/2024 10:52 AM)  Circulation: 1 (10/10/2024 10:52 AM)  Consciousness: 0 (10/10/2024 10:52 AM)  Oxygen Saturation: 1 (10/10/2024 10:52 AM)  Modified Magen Score: 3 (10/10/2024 10:52 AM)

## 2024-10-15 PROCEDURE — 88342 IMHCHEM/IMCYTCHM 1ST ANTB: CPT | Performed by: PATHOLOGY

## 2024-10-15 PROCEDURE — 88305 TISSUE EXAM BY PATHOLOGIST: CPT | Performed by: PATHOLOGY

## 2024-10-16 NOTE — RESULT ENCOUNTER NOTE
RECALL none.    Nursing please relay message below:    Dear Vonnie      The biopsies obtained during your endoscopy were negative/normal.  There is no evidence of significant inflammation, infection or any precancerous changes.  There is no change in the recommendations. Please continue with your medication and follow-up as scheduled in the office. Thank you.     Best regards,    Dr Way

## 2024-11-01 DIAGNOSIS — R13.19 ESOPHAGEAL DYSPHAGIA: ICD-10-CM

## 2024-11-01 DIAGNOSIS — K21.9 GASTROESOPHAGEAL REFLUX DISEASE WITHOUT ESOPHAGITIS: ICD-10-CM

## 2024-11-01 RX ORDER — OMEPRAZOLE 40 MG/1
40 CAPSULE, DELAYED RELEASE ORAL DAILY
Qty: 90 CAPSULE | Refills: 1 | Status: SHIPPED | OUTPATIENT
Start: 2024-11-01

## 2024-11-22 ENCOUNTER — OFFICE VISIT (OUTPATIENT)
Dept: GASTROENTEROLOGY | Facility: CLINIC | Age: 84
End: 2024-11-22
Payer: COMMERCIAL

## 2024-11-22 VITALS
WEIGHT: 128.6 LBS | DIASTOLIC BLOOD PRESSURE: 62 MMHG | HEIGHT: 62 IN | BODY MASS INDEX: 23.66 KG/M2 | SYSTOLIC BLOOD PRESSURE: 118 MMHG

## 2024-11-22 DIAGNOSIS — R13.19 ESOPHAGEAL DYSPHAGIA: ICD-10-CM

## 2024-11-22 DIAGNOSIS — G45.9 TIA (TRANSIENT ISCHEMIC ATTACK): ICD-10-CM

## 2024-11-22 DIAGNOSIS — K58.2 IRRITABLE BOWEL SYNDROME WITH BOTH CONSTIPATION AND DIARRHEA: ICD-10-CM

## 2024-11-22 DIAGNOSIS — K21.9 GASTROESOPHAGEAL REFLUX DISEASE WITHOUT ESOPHAGITIS: Primary | ICD-10-CM

## 2024-11-22 DIAGNOSIS — Z86.0100 HISTORY OF COLON POLYPS: ICD-10-CM

## 2024-11-22 PROCEDURE — G2211 COMPLEX E/M VISIT ADD ON: HCPCS | Performed by: INTERNAL MEDICINE

## 2024-11-22 PROCEDURE — 99214 OFFICE O/P EST MOD 30 MIN: CPT | Performed by: INTERNAL MEDICINE

## 2024-11-22 RX ORDER — DORZOLAMIDE HYDROCHLORIDE AND TIMOLOL MALEATE 20; 5 MG/ML; MG/ML
SOLUTION/ DROPS OPHTHALMIC
COMMUNITY
Start: 2024-10-14

## 2024-11-22 RX ORDER — OMEPRAZOLE 40 MG/1
40 CAPSULE, DELAYED RELEASE ORAL DAILY
Qty: 90 CAPSULE | Refills: 3 | Status: SHIPPED | OUTPATIENT
Start: 2024-11-22

## 2024-11-22 NOTE — ASSESSMENT & PLAN NOTE
S/p dilation 10/2024 w improvement in symptoms.    Orders:    omeprazole (PriLOSEC) 40 MG capsule; Take 1 capsule (40 mg total) by mouth daily

## 2024-11-22 NOTE — PROGRESS NOTES
Name: Vonnie Villegas      : 1940      MRN: [id number]  Encounter Provider: Kari Way MD  Encounter Date: 2024   Encounter department: [facility]  :  Assessment & Plan  Gastroesophageal reflux disease without esophagitis  Much improved on omeprazole (vs pepcid) so will stay on this for now.    Orders:    omeprazole (PriLOSEC) 40 MG capsule; Take 1 capsule (40 mg total) by mouth daily    Esophageal dysphagia  S/p dilation 10/2024 w improvement in symptoms.    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       TIA (transient ischemic attack)  On Plavix         History of colon polyps  No further recall.           History of Present Illness       Vonnie Villegas is a 84 y.o. female who presents today for follow-up.  Status post dilation last month with significant improvement in her dysphagia.  Still trying to eat slowly and thoughtfully, cutting her food up smaller.  She is feeling better also on the omeprazole versus the Pepcid.  No other GI issues.  Bowels are much improved and regular.  Denies constipation or diarrhea or bleeding.  No abdominal pain.    History obtained from: patient    Review of Systems   All other systems reviewed and are negative.    Past Medical History   Past Medical History:   Diagnosis Date    Arthritis     Colon polyp     GERD (gastroesophageal reflux disease)     Glaucoma     Hyperlipidemia     Irritable bowel syndrome     S/P dilatation of esophageal stricture     Stroke (HCC)     TIA (transient ischemic attack)      Past Surgical History:   Procedure Laterality Date    ANKLE FRACTURE SURGERY Right     APPENDECTOMY      CATARACT EXTRACTION Left     CHOLECYSTECTOMY      COLONOSCOPY      ELBOW FRACTURE SURGERY Left     GALLBLADDER SURGERY      TONSILLECTOMY      TUBAL LIGATION      UPPER GASTROINTESTINAL  ENDOSCOPY      WRIST FRACTURE SURGERY Left      Family History   Adopted: Yes   Problem Relation Age of Onset    Colon cancer Neg Hx     Colon polyps Neg Hx       reports that she has never smoked. She has never been exposed to tobacco smoke. She has never used smokeless tobacco. She reports that she does not currently use alcohol. She reports that she does not use drugs.  Current Outpatient Medications on File Prior to Visit   Medication Sig Dispense Refill    acetaminophen (TYLENOL) 500 mg tablet Take 500 mg by mouth 3 (three) times a day as needed for mild pain      Albuterol Sulfate (ProAir RespiClick) 108 (90 Base) MCG/ACT AEPB Inhale 2 puffs every 4 (four) hours as needed      Cholecalciferol (Vitamin D) 50 MCG (2000 UT) CAPS Take by mouth daily      clopidogrel (PLAVIX) 75 mg tablet       dorzolamide-timolol (COSOPT) 2-0.5 % ophthalmic solution LOCATION PUT 1 DROP INTO BOTH EYES TWICE A DAY      ibandronate (BONIVA) 150 MG tablet Take 150 mg by mouth every 30 (thirty) days      Premarin vaginal cream Insert into the vagina daily      simvastatin (ZOCOR) 40 mg tablet Take 40 mg by mouth daily at bedtime      [DISCONTINUED] omeprazole (PriLOSEC) 40 MG capsule TAKE 1 CAPSULE (40 MG TOTAL) BY MOUTH DAILY. 90 capsule 1    [DISCONTINUED] Coenzyme Q10 (CoQ10) 100 MG CAPS Take by mouth daily (Patient not taking: Reported on 11/22/2024)      [DISCONTINUED] sertraline (ZOLOFT) 50 mg tablet 50 mg (Patient not taking: Reported on 11/22/2024)      [DISCONTINUED] timolol (TIMOPTIC) 0.5 % ophthalmic solution PLACE 1 DROP INTO LEFT EYE EVERY MORNING (Patient not taking: No sig reported)       No current facility-administered medications on file prior to visit.     Allergies   Allergen Reactions    Aspirin Other (See Comments), GI Intolerance and Hives     Doesn't set well in stomach    Gentamicin Hives    Ibuprofen Swelling and Other (See Comments)     Hands and feet    Amoxicillin-Pot Clavulanate GI Intolerance     "Esomeprazole Myalgia    Hydrocodone GI Intolerance    Nortriptyline GI Intolerance     \"heartburn\"    Prednisone Other (See Comments)     Hyper/insomnia/flushed      Current Outpatient Medications on File Prior to Visit   Medication Sig Dispense Refill    acetaminophen (TYLENOL) 500 mg tablet Take 500 mg by mouth 3 (three) times a day as needed for mild pain      Albuterol Sulfate (ProAir RespiClick) 108 (90 Base) MCG/ACT AEPB Inhale 2 puffs every 4 (four) hours as needed      Cholecalciferol (Vitamin D) 50 MCG (2000 UT) CAPS Take by mouth daily      clopidogrel (PLAVIX) 75 mg tablet       dorzolamide-timolol (COSOPT) 2-0.5 % ophthalmic solution LOCATION PUT 1 DROP INTO BOTH EYES TWICE A DAY      ibandronate (BONIVA) 150 MG tablet Take 150 mg by mouth every 30 (thirty) days      Premarin vaginal cream Insert into the vagina daily      simvastatin (ZOCOR) 40 mg tablet Take 40 mg by mouth daily at bedtime      [DISCONTINUED] omeprazole (PriLOSEC) 40 MG capsule TAKE 1 CAPSULE (40 MG TOTAL) BY MOUTH DAILY. 90 capsule 1    [DISCONTINUED] Coenzyme Q10 (CoQ10) 100 MG CAPS Take by mouth daily (Patient not taking: Reported on 11/22/2024)      [DISCONTINUED] sertraline (ZOLOFT) 50 mg tablet 50 mg (Patient not taking: Reported on 11/22/2024)      [DISCONTINUED] timolol (TIMOPTIC) 0.5 % ophthalmic solution PLACE 1 DROP INTO LEFT EYE EVERY MORNING (Patient not taking: No sig reported)       No current facility-administered medications on file prior to visit.         Objective   /62   Ht 5' 2\" (1.575 m)   Wt 58.3 kg (128 lb 9.6 oz)   BMI 23.52 kg/m²      Physical Exam  Vitals and nursing note reviewed.   Constitutional:       General: She is not in acute distress.     Appearance: She is well-developed.   HENT:      Head: Normocephalic and atraumatic.   Eyes:      Conjunctiva/sclera: Conjunctivae normal.   Cardiovascular:      Rate and Rhythm: Normal rate and regular rhythm.      Heart sounds: No murmur heard.  Pulmonary: "      Effort: Pulmonary effort is normal. No respiratory distress.      Breath sounds: Normal breath sounds.   Abdominal:      Palpations: Abdomen is soft.      Tenderness: There is no abdominal tenderness.   Musculoskeletal:         General: No swelling.      Cervical back: Neck supple.   Skin:     General: Skin is warm and dry.      Capillary Refill: Capillary refill takes less than 2 seconds.   Neurological:      Mental Status: She is alert.   Psychiatric:         Mood and Affect: Mood normal.

## 2024-11-22 NOTE — ASSESSMENT & PLAN NOTE
Much improved on omeprazole (vs pepcid) so will stay on this for now.    Orders:    omeprazole (PriLOSEC) 40 MG capsule; Take 1 capsule (40 mg total) by mouth daily